# Patient Record
Sex: FEMALE | Race: WHITE | NOT HISPANIC OR LATINO
[De-identification: names, ages, dates, MRNs, and addresses within clinical notes are randomized per-mention and may not be internally consistent; named-entity substitution may affect disease eponyms.]

---

## 2018-01-31 ENCOUNTER — RECORDS - HEALTHEAST (OUTPATIENT)
Dept: ADMINISTRATIVE | Facility: OTHER | Age: 58
End: 2018-01-31

## 2018-02-08 ENCOUNTER — RECORDS - HEALTHEAST (OUTPATIENT)
Dept: ADMINISTRATIVE | Facility: OTHER | Age: 58
End: 2018-02-08

## 2018-02-14 ENCOUNTER — RECORDS - HEALTHEAST (OUTPATIENT)
Dept: ADMINISTRATIVE | Facility: OTHER | Age: 58
End: 2018-02-14

## 2018-02-27 ENCOUNTER — RECORDS - HEALTHEAST (OUTPATIENT)
Dept: ADMINISTRATIVE | Facility: OTHER | Age: 58
End: 2018-02-27

## 2018-03-05 ENCOUNTER — RECORDS - HEALTHEAST (OUTPATIENT)
Dept: ADMINISTRATIVE | Facility: OTHER | Age: 58
End: 2018-03-05

## 2018-03-21 ENCOUNTER — RECORDS - HEALTHEAST (OUTPATIENT)
Dept: ADMINISTRATIVE | Facility: OTHER | Age: 58
End: 2018-03-21

## 2018-06-20 ENCOUNTER — RECORDS - HEALTHEAST (OUTPATIENT)
Dept: ADMINISTRATIVE | Facility: OTHER | Age: 58
End: 2018-06-20

## 2018-07-25 ENCOUNTER — RECORDS - HEALTHEAST (OUTPATIENT)
Dept: ADMINISTRATIVE | Facility: OTHER | Age: 58
End: 2018-07-25

## 2018-08-15 ENCOUNTER — RECORDS - HEALTHEAST (OUTPATIENT)
Dept: ADMINISTRATIVE | Facility: OTHER | Age: 58
End: 2018-08-15

## 2018-09-05 ENCOUNTER — RECORDS - HEALTHEAST (OUTPATIENT)
Dept: ADMINISTRATIVE | Facility: OTHER | Age: 58
End: 2018-09-05

## 2018-09-26 ENCOUNTER — RECORDS - HEALTHEAST (OUTPATIENT)
Dept: ADMINISTRATIVE | Facility: OTHER | Age: 58
End: 2018-09-26

## 2018-10-10 ENCOUNTER — RECORDS - HEALTHEAST (OUTPATIENT)
Dept: ADMINISTRATIVE | Facility: OTHER | Age: 58
End: 2018-10-10

## 2018-11-26 ENCOUNTER — COMMUNICATION - HEALTHEAST (OUTPATIENT)
Dept: NEUROSURGERY | Facility: CLINIC | Age: 58
End: 2018-11-26

## 2018-11-26 ENCOUNTER — RECORDS - HEALTHEAST (OUTPATIENT)
Dept: ADMINISTRATIVE | Facility: OTHER | Age: 58
End: 2018-11-26

## 2018-11-26 ENCOUNTER — COMMUNICATION - HEALTHEAST (OUTPATIENT)
Dept: NEUROLOGY | Facility: CLINIC | Age: 58
End: 2018-11-26

## 2018-11-26 DIAGNOSIS — D49.6 BRAIN TUMOR (H): ICD-10-CM

## 2018-11-27 ENCOUNTER — AMBULATORY - HEALTHEAST (OUTPATIENT)
Dept: NEUROSURGERY | Facility: CLINIC | Age: 58
End: 2018-11-27

## 2018-11-27 ENCOUNTER — RECORDS - HEALTHEAST (OUTPATIENT)
Dept: ADMINISTRATIVE | Facility: OTHER | Age: 58
End: 2018-11-27

## 2018-11-27 ENCOUNTER — COMMUNICATION - HEALTHEAST (OUTPATIENT)
Dept: NEUROSURGERY | Facility: CLINIC | Age: 58
End: 2018-11-27

## 2018-11-27 DIAGNOSIS — Z01.818 PRE-OP EVALUATION: ICD-10-CM

## 2018-11-27 DIAGNOSIS — D49.6 BRAIN TUMOR (H): ICD-10-CM

## 2018-11-28 ENCOUNTER — OFFICE VISIT - HEALTHEAST (OUTPATIENT)
Dept: NEUROSURGERY | Facility: CLINIC | Age: 58
End: 2018-11-28

## 2018-11-28 ENCOUNTER — AMBULATORY - HEALTHEAST (OUTPATIENT)
Dept: LAB | Facility: CLINIC | Age: 58
End: 2018-11-28

## 2018-11-28 DIAGNOSIS — Z01.818 PRE-OP EVALUATION: ICD-10-CM

## 2018-11-28 DIAGNOSIS — D49.6 BRAIN TUMOR (H): ICD-10-CM

## 2018-11-28 LAB
ANION GAP SERPL CALCULATED.3IONS-SCNC: 11 MMOL/L (ref 5–18)
APTT PPP: 26 SECONDS (ref 24–37)
BASOPHILS # BLD AUTO: 0 THOU/UL (ref 0–0.2)
BASOPHILS NFR BLD AUTO: 0 % (ref 0–2)
BUN SERPL-MCNC: 24 MG/DL (ref 8–22)
CALCIUM SERPL-MCNC: 10 MG/DL (ref 8.5–10.5)
CHLORIDE BLD-SCNC: 102 MMOL/L (ref 98–107)
CLOSURE TME COLL+EPINEP BLD: 69 SEC (ref 1–180)
CO2 SERPL-SCNC: 24 MMOL/L (ref 22–31)
CREAT SERPL-MCNC: 0.83 MG/DL (ref 0.6–1.1)
EOSINOPHIL # BLD AUTO: 0 THOU/UL (ref 0–0.4)
EOSINOPHIL NFR BLD AUTO: 0 % (ref 0–6)
ERYTHROCYTE [DISTWIDTH] IN BLOOD BY AUTOMATED COUNT: 12.9 % (ref 11–14.5)
GFR SERPL CREATININE-BSD FRML MDRD: >60 ML/MIN/1.73M2
GLUCOSE BLD-MCNC: 205 MG/DL (ref 70–125)
HCT VFR BLD AUTO: 35.9 % (ref 35–47)
HGB BLD-MCNC: 12.4 G/DL (ref 12–16)
INR PPP: 1.08 (ref 0.9–1.1)
LYMPHOCYTES # BLD AUTO: 1.1 THOU/UL (ref 0.8–4.4)
LYMPHOCYTES NFR BLD AUTO: 10 % (ref 20–40)
MCH RBC QN AUTO: 31.4 PG (ref 27–34)
MCHC RBC AUTO-ENTMCNC: 34.5 G/DL (ref 32–36)
MCV RBC AUTO: 91 FL (ref 80–100)
MONOCYTES # BLD AUTO: 0.4 THOU/UL (ref 0–0.9)
MONOCYTES NFR BLD AUTO: 4 % (ref 2–10)
NEUTROPHILS # BLD AUTO: 9.9 THOU/UL (ref 2–7.7)
NEUTROPHILS NFR BLD AUTO: 87 % (ref 50–70)
PLATELET # BLD AUTO: 328 THOU/UL (ref 140–440)
PMV BLD AUTO: 9.6 FL (ref 8.5–12.5)
POTASSIUM BLD-SCNC: 3.9 MMOL/L (ref 3.5–5)
RBC # BLD AUTO: 3.95 MILL/UL (ref 3.8–5.4)
SODIUM SERPL-SCNC: 137 MMOL/L (ref 136–145)
WBC: 11.6 THOU/UL (ref 4–11)

## 2018-11-29 ENCOUNTER — OFFICE VISIT - HEALTHEAST (OUTPATIENT)
Dept: PALLIATIVE CARE | Facility: CLINIC | Age: 58
End: 2018-11-29

## 2018-11-29 DIAGNOSIS — Z51.5 ENCOUNTER FOR PALLIATIVE CARE: ICD-10-CM

## 2018-11-29 DIAGNOSIS — C54.1 ENDOMETRIAL CANCER (H): ICD-10-CM

## 2018-11-29 ASSESSMENT — MIFFLIN-ST. JEOR: SCORE: 1268.18

## 2018-11-30 ENCOUNTER — COMMUNICATION - HEALTHEAST (OUTPATIENT)
Dept: ONCOLOGY | Facility: HOSPITAL | Age: 58
End: 2018-11-30

## 2018-12-03 ENCOUNTER — SURGERY - HEALTHEAST (OUTPATIENT)
Dept: SURGERY | Facility: CLINIC | Age: 58
End: 2018-12-03

## 2018-12-03 ENCOUNTER — RECORDS - HEALTHEAST (OUTPATIENT)
Dept: ADMINISTRATIVE | Facility: OTHER | Age: 58
End: 2018-12-03

## 2018-12-03 ENCOUNTER — HOSPITAL ENCOUNTER (OUTPATIENT)
Dept: MRI IMAGING | Facility: CLINIC | Age: 58
Discharge: HOME | End: 2018-12-03
Attending: SURGERY | Admitting: SURGERY

## 2018-12-03 ENCOUNTER — ANESTHESIA - HEALTHEAST (OUTPATIENT)
Dept: SURGERY | Facility: CLINIC | Age: 58
End: 2018-12-03

## 2018-12-03 DIAGNOSIS — D49.6 BRAIN TUMOR (H): ICD-10-CM

## 2018-12-03 DIAGNOSIS — Z01.818 PRE-OP EVALUATION: ICD-10-CM

## 2018-12-03 ASSESSMENT — MIFFLIN-ST. JEOR: SCORE: 1254.57

## 2018-12-04 ENCOUNTER — RECORDS - HEALTHEAST (OUTPATIENT)
Dept: ADMINISTRATIVE | Facility: OTHER | Age: 58
End: 2018-12-04

## 2018-12-04 ASSESSMENT — MIFFLIN-ST. JEOR: SCORE: 1278.5

## 2018-12-05 ASSESSMENT — MIFFLIN-ST. JEOR: SCORE: 1278.5

## 2018-12-06 ASSESSMENT — MIFFLIN-ST. JEOR: SCORE: 1308.89

## 2018-12-07 ASSESSMENT — MIFFLIN-ST. JEOR: SCORE: 1312.97

## 2018-12-08 ASSESSMENT — MIFFLIN-ST. JEOR: SCORE: 1284.39

## 2018-12-09 ENCOUNTER — COMMUNICATION - HEALTHEAST (OUTPATIENT)
Dept: NEUROLOGY | Facility: CLINIC | Age: 58
End: 2018-12-09

## 2018-12-09 ASSESSMENT — MIFFLIN-ST. JEOR: SCORE: 1265.34

## 2018-12-11 ENCOUNTER — COMMUNICATION - HEALTHEAST (OUTPATIENT)
Dept: NEUROSURGERY | Facility: CLINIC | Age: 58
End: 2018-12-11

## 2018-12-11 DIAGNOSIS — C79.31 METASTATIC ADENOCARCINOMA TO BRAIN (H): ICD-10-CM

## 2018-12-12 ENCOUNTER — AMBULATORY - HEALTHEAST (OUTPATIENT)
Dept: NEUROSURGERY | Facility: CLINIC | Age: 58
End: 2018-12-12

## 2018-12-12 ENCOUNTER — COMMUNICATION - HEALTHEAST (OUTPATIENT)
Dept: NEUROSURGERY | Facility: CLINIC | Age: 58
End: 2018-12-12

## 2018-12-12 DIAGNOSIS — Z51.89 VISIT FOR WOUND CHECK: ICD-10-CM

## 2018-12-12 DIAGNOSIS — D49.6 BRAIN TUMOR (H): ICD-10-CM

## 2018-12-13 ENCOUNTER — OFFICE VISIT - HEALTHEAST (OUTPATIENT)
Dept: OCCUPATIONAL THERAPY | Facility: REHABILITATION | Age: 58
End: 2018-12-13

## 2018-12-13 ENCOUNTER — RECORDS - HEALTHEAST (OUTPATIENT)
Dept: ADMINISTRATIVE | Facility: OTHER | Age: 58
End: 2018-12-13

## 2018-12-13 DIAGNOSIS — R41.89 COGNITIVE CHANGES: ICD-10-CM

## 2018-12-13 DIAGNOSIS — Z78.9 DECREASED ACTIVITIES OF DAILY LIVING (ADL): ICD-10-CM

## 2018-12-18 ENCOUNTER — OFFICE VISIT - HEALTHEAST (OUTPATIENT)
Dept: ONCOLOGY | Facility: HOSPITAL | Age: 58
End: 2018-12-18

## 2018-12-18 DIAGNOSIS — C79.31 BRAIN METASTASES: ICD-10-CM

## 2018-12-18 DIAGNOSIS — C54.1 ENDOMETRIAL CANCER (H): ICD-10-CM

## 2018-12-24 ENCOUNTER — HOSPITAL ENCOUNTER (OUTPATIENT)
Dept: MRI IMAGING | Facility: CLINIC | Age: 58
Discharge: HOME | End: 2018-12-24
Attending: SURGERY

## 2018-12-24 ENCOUNTER — AMBULATORY - HEALTHEAST (OUTPATIENT)
Dept: NEUROSURGERY | Facility: CLINIC | Age: 58
End: 2018-12-24

## 2018-12-24 ENCOUNTER — OFFICE VISIT - HEALTHEAST (OUTPATIENT)
Dept: SPEECH THERAPY | Facility: REHABILITATION | Age: 58
End: 2018-12-24

## 2018-12-24 DIAGNOSIS — C79.31 METASTATIC ADENOCARCINOMA TO BRAIN (H): ICD-10-CM

## 2018-12-24 DIAGNOSIS — R41.841 COGNITIVE COMMUNICATION DEFICIT: ICD-10-CM

## 2018-12-24 DIAGNOSIS — R47.01 APHASIA: ICD-10-CM

## 2018-12-24 DIAGNOSIS — Z48.02: ICD-10-CM

## 2018-12-24 DIAGNOSIS — Z48.02 ENCOUNTER FOR REMOVAL OF SUTURES: ICD-10-CM

## 2018-12-28 ENCOUNTER — AMBULATORY - HEALTHEAST (OUTPATIENT)
Dept: ONCOLOGY | Facility: HOSPITAL | Age: 58
End: 2018-12-28

## 2018-12-28 DIAGNOSIS — C79.31 BRAIN METASTASIS: ICD-10-CM

## 2018-12-28 DIAGNOSIS — C54.1 ENDOMETRIAL CANCER (H): ICD-10-CM

## 2018-12-29 ENCOUNTER — HOME CARE/HOSPICE - HEALTHEAST (OUTPATIENT)
Dept: HOSPICE | Facility: HOSPICE | Age: 58
End: 2018-12-29

## 2018-12-30 ENCOUNTER — HOME CARE/HOSPICE - HEALTHEAST (OUTPATIENT)
Dept: HOSPICE | Facility: HOSPICE | Age: 58
End: 2018-12-30

## 2018-12-31 ENCOUNTER — COMMUNICATION - HEALTHEAST (OUTPATIENT)
Dept: SPEECH THERAPY | Facility: REHABILITATION | Age: 58
End: 2018-12-31

## 2019-01-02 ENCOUNTER — OFFICE VISIT - HEALTHEAST (OUTPATIENT)
Dept: RADIATION ONCOLOGY | Facility: HOSPITAL | Age: 59
End: 2019-01-02

## 2019-01-02 ENCOUNTER — AMBULATORY - HEALTHEAST (OUTPATIENT)
Dept: RADIATION ONCOLOGY | Facility: HOSPITAL | Age: 59
End: 2019-01-02

## 2019-01-02 ENCOUNTER — COMMUNICATION - HEALTHEAST (OUTPATIENT)
Dept: NEUROSURGERY | Facility: CLINIC | Age: 59
End: 2019-01-02

## 2019-01-02 DIAGNOSIS — C54.1 ENDOMETRIAL CANCER (H): ICD-10-CM

## 2019-01-02 DIAGNOSIS — G89.18 POSTOPERATIVE PAIN: ICD-10-CM

## 2019-01-02 DIAGNOSIS — C79.31 BRAIN METASTASES: ICD-10-CM

## 2019-01-04 ENCOUNTER — HOME CARE/HOSPICE - HEALTHEAST (OUTPATIENT)
Dept: HOSPICE | Facility: HOSPICE | Age: 59
End: 2019-01-04

## 2019-01-07 ENCOUNTER — COMMUNICATION - HEALTHEAST (OUTPATIENT)
Dept: RADIATION ONCOLOGY | Facility: HOSPITAL | Age: 59
End: 2019-01-07

## 2019-01-07 ENCOUNTER — COMMUNICATION - HEALTHEAST (OUTPATIENT)
Dept: NEUROSURGERY | Facility: CLINIC | Age: 59
End: 2019-01-07

## 2019-01-14 ENCOUNTER — OFFICE VISIT - HEALTHEAST (OUTPATIENT)
Dept: RADIATION ONCOLOGY | Facility: CLINIC | Age: 59
End: 2019-01-14

## 2019-01-14 DIAGNOSIS — F41.9 ANXIETY: ICD-10-CM

## 2019-01-14 DIAGNOSIS — C79.31 BRAIN METASTASES: ICD-10-CM

## 2019-01-14 DIAGNOSIS — D49.6 BRAIN TUMOR (H): ICD-10-CM

## 2019-01-14 RX ORDER — IBUPROFEN 200 MG
600 TABLET ORAL EVERY 6 HOURS PRN
Status: SHIPPED | COMMUNITY
Start: 2019-01-14

## 2019-01-15 ENCOUNTER — OFFICE VISIT - HEALTHEAST (OUTPATIENT)
Dept: RADIATION ONCOLOGY | Facility: HOSPITAL | Age: 59
End: 2019-01-15

## 2019-01-15 DIAGNOSIS — Z51.5 ENCOUNTER FOR PALLIATIVE CARE: ICD-10-CM

## 2019-01-15 DIAGNOSIS — C54.1 ENDOMETRIAL CANCER (H): ICD-10-CM

## 2019-01-15 DIAGNOSIS — G89.3 CANCER RELATED PAIN: ICD-10-CM

## 2019-01-16 ENCOUNTER — HOME CARE/HOSPICE - HEALTHEAST (OUTPATIENT)
Dept: HOSPICE | Facility: HOSPICE | Age: 59
End: 2019-01-16

## 2019-01-17 ENCOUNTER — OFFICE VISIT - HEALTHEAST (OUTPATIENT)
Dept: ONCOLOGY | Facility: HOSPITAL | Age: 59
End: 2019-01-17

## 2019-01-17 DIAGNOSIS — C54.1 ENDOMETRIAL CANCER (H): ICD-10-CM

## 2019-01-17 DIAGNOSIS — F43.25 ADJUSTMENT REACTION WITH MIXED DISTURBANCE OF EMOTIONS AND CONDUCT: ICD-10-CM

## 2019-01-17 DIAGNOSIS — C79.31 BRAIN METASTASIS: ICD-10-CM

## 2019-01-18 ENCOUNTER — OFFICE VISIT - HEALTHEAST (OUTPATIENT)
Dept: NEUROSURGERY | Facility: CLINIC | Age: 59
End: 2019-01-18

## 2019-01-18 DIAGNOSIS — D49.6 BRAIN TUMOR (H): ICD-10-CM

## 2019-01-18 ASSESSMENT — MIFFLIN-ST. JEOR: SCORE: 1267.61

## 2019-02-07 ENCOUNTER — HOME CARE/HOSPICE - HEALTHEAST (OUTPATIENT)
Dept: HOSPICE | Facility: HOSPICE | Age: 59
End: 2019-02-07

## 2019-02-10 ENCOUNTER — HOME CARE/HOSPICE - HEALTHEAST (OUTPATIENT)
Dept: HOSPICE | Facility: HOSPICE | Age: 59
End: 2019-02-10

## 2019-02-11 ENCOUNTER — HOME CARE/HOSPICE - HEALTHEAST (OUTPATIENT)
Dept: HOSPICE | Facility: HOSPICE | Age: 59
End: 2019-02-11

## 2019-02-11 ENCOUNTER — AMBULATORY - HEALTHEAST (OUTPATIENT)
Dept: OTHER | Facility: CLINIC | Age: 59
End: 2019-02-11

## 2019-02-11 RX ORDER — PHENOL 1.4 %
10 AEROSOL, SPRAY (ML) MUCOUS MEMBRANE AT BEDTIME
Status: SHIPPED | COMMUNITY
Start: 2019-02-11

## 2019-02-12 ENCOUNTER — HOME CARE/HOSPICE - HEALTHEAST (OUTPATIENT)
Dept: HOSPICE | Facility: HOSPICE | Age: 59
End: 2019-02-12

## 2019-02-12 ENCOUNTER — AMBULATORY - HEALTHEAST (OUTPATIENT)
Dept: HOSPICE | Facility: HOSPICE | Age: 59
End: 2019-02-12

## 2019-02-13 ENCOUNTER — HOME CARE/HOSPICE - HEALTHEAST (OUTPATIENT)
Dept: HOSPICE | Facility: HOSPICE | Age: 59
End: 2019-02-13

## 2019-02-14 ENCOUNTER — HOME CARE/HOSPICE - HEALTHEAST (OUTPATIENT)
Dept: HOSPICE | Facility: HOSPICE | Age: 59
End: 2019-02-14

## 2019-02-14 RX ORDER — ATROPINE SULFATE 10 MG/ML
2 SOLUTION/ DROPS OPHTHALMIC EVERY 6 HOURS PRN
Status: SHIPPED | COMMUNITY
Start: 2019-02-14

## 2019-02-14 RX ORDER — HALOPERIDOL 2 MG/ML
0.5-2 SOLUTION ORAL EVERY 4 HOURS PRN
Status: SHIPPED | COMMUNITY
Start: 2019-02-14

## 2019-02-16 ENCOUNTER — HOME CARE/HOSPICE - HEALTHEAST (OUTPATIENT)
Dept: HOSPICE | Facility: HOSPICE | Age: 59
End: 2019-02-16

## 2019-02-16 ENCOUNTER — AMBULATORY - HEALTHEAST (OUTPATIENT)
Dept: HOSPICE | Facility: HOSPICE | Age: 59
End: 2019-02-16

## 2019-02-16 RX ORDER — OXYCODONE HYDROCHLORIDE 5 MG/1
10 TABLET ORAL
Status: SHIPPED | COMMUNITY
Start: 2019-02-16

## 2019-02-16 RX ORDER — OXYCODONE HYDROCHLORIDE 5 MG/1
10 TABLET ORAL EVERY 4 HOURS
Status: SHIPPED | COMMUNITY
Start: 2019-02-16

## 2019-02-16 RX ORDER — POLYETHYLENE GLYCOL 3350 17 G/17G
17 POWDER, FOR SOLUTION ORAL DAILY
Status: SHIPPED | COMMUNITY
Start: 2019-02-16

## 2019-02-16 RX ORDER — DEXAMETHASONE 4 MG/1
4 TABLET ORAL
Status: SHIPPED | COMMUNITY
Start: 2019-02-16

## 2019-02-16 RX ORDER — AMOXICILLIN 250 MG
2 CAPSULE ORAL 2 TIMES DAILY
Status: SHIPPED | COMMUNITY
Start: 2019-02-16

## 2019-02-17 ENCOUNTER — HOME CARE/HOSPICE - HEALTHEAST (OUTPATIENT)
Dept: HOSPICE | Facility: HOSPICE | Age: 59
End: 2019-02-17

## 2019-02-17 ENCOUNTER — RECORDS - HEALTHEAST (OUTPATIENT)
Dept: ADMINISTRATIVE | Facility: OTHER | Age: 59
End: 2019-02-17

## 2019-02-17 RX ORDER — OXYCODONE HYDROCHLORIDE 5 MG/1
15 TABLET ORAL AT BEDTIME
Status: SHIPPED | COMMUNITY
Start: 2019-02-16

## 2019-02-18 ENCOUNTER — HOME CARE/HOSPICE - HEALTHEAST (OUTPATIENT)
Dept: HOSPICE | Facility: HOSPICE | Age: 59
End: 2019-02-18

## 2019-02-18 RX ORDER — LORAZEPAM 2 MG/ML
1 CONCENTRATE ORAL EVERY 4 HOURS
Status: SHIPPED | COMMUNITY
Start: 2019-02-18

## 2019-02-18 RX ORDER — LORAZEPAM 2 MG/ML
2 CONCENTRATE ORAL SEE ADMIN INSTRUCTIONS
Status: SHIPPED | COMMUNITY
Start: 2019-02-18

## 2019-02-19 ENCOUNTER — HOME CARE/HOSPICE - HEALTHEAST (OUTPATIENT)
Dept: HOSPICE | Facility: HOSPICE | Age: 59
End: 2019-02-19

## 2019-02-19 ENCOUNTER — AMBULATORY - HEALTHEAST (OUTPATIENT)
Dept: HOSPICE | Facility: HOSPICE | Age: 59
End: 2019-02-19

## 2019-02-20 ENCOUNTER — HOME CARE/HOSPICE - HEALTHEAST (OUTPATIENT)
Dept: HOSPICE | Facility: HOSPICE | Age: 59
End: 2019-02-20

## 2019-02-21 ENCOUNTER — OFFICE VISIT - HEALTHEAST (OUTPATIENT)
Dept: ONCOLOGY | Facility: HOSPITAL | Age: 59
End: 2019-02-21

## 2019-02-21 DIAGNOSIS — F43.25 ADJUSTMENT REACTION WITH MIXED DISTURBANCE OF EMOTIONS AND CONDUCT: ICD-10-CM

## 2019-02-22 ENCOUNTER — COMMUNICATION - HEALTHEAST (OUTPATIENT)
Dept: NEUROSURGERY | Facility: CLINIC | Age: 59
End: 2019-02-22

## 2019-02-25 ENCOUNTER — COMMUNICATION - HEALTHEAST (OUTPATIENT)
Dept: RADIATION ONCOLOGY | Facility: CLINIC | Age: 59
End: 2019-02-25

## 2019-05-30 ENCOUNTER — OFFICE VISIT - HEALTHEAST (OUTPATIENT)
Dept: ONCOLOGY | Facility: HOSPITAL | Age: 59
End: 2019-05-30

## 2019-05-30 DIAGNOSIS — F43.21 GRIEF REACTION: ICD-10-CM

## 2021-05-29 NOTE — PROGRESS NOTES
I met with Sheila's , Bear for an emotional support visit. Sheila  on 2019.  Bear processed his grief and loss related to her death.  We also discussed other stress issues that he is having related to needing to move to a new home in a new community as he will be starting a new position with his company about 4 hours away from his current home in [place].    We discussed ongoing strategies to help Bear cope manage his grief.  I also suggested that he may benefit from an antidepressant or an antianxiety medication.  He plans to contact a physician to further explore this option.    Plan: I will continue to be available to build for ongoing grief support and assistance as needed.    Carmen Vasquez MA, LP, LICSW

## 2021-06-02 VITALS — BODY MASS INDEX: 23.73 KG/M2 | WEIGHT: 147 LBS

## 2021-06-02 VITALS — BODY MASS INDEX: 23.95 KG/M2 | HEIGHT: 66 IN | WEIGHT: 149 LBS

## 2021-06-02 VITALS — HEIGHT: 66 IN | BODY MASS INDEX: 23.87 KG/M2 | WEIGHT: 148.5 LBS

## 2021-06-02 VITALS — BODY MASS INDEX: 24.16 KG/M2 | WEIGHT: 149.7 LBS

## 2021-06-02 VITALS — BODY MASS INDEX: 23.97 KG/M2 | HEIGHT: 66 IN | WEIGHT: 149.13 LBS

## 2021-06-02 VITALS — BODY MASS INDEX: 23.69 KG/M2 | WEIGHT: 146.8 LBS

## 2021-06-16 PROBLEM — F32.A DEPRESSION: Status: ACTIVE | Noted: 2018-11-23

## 2021-06-16 PROBLEM — I10 HTN (HYPERTENSION): Status: ACTIVE | Noted: 2018-11-06

## 2021-06-16 PROBLEM — D49.6 BRAIN TUMOR (H): Status: ACTIVE | Noted: 2018-12-03

## 2021-06-16 PROBLEM — G93.6 VASOGENIC EDEMA (H): Status: ACTIVE | Noted: 2018-11-23

## 2021-06-16 PROBLEM — F41.9 ANXIETY: Status: ACTIVE | Noted: 2018-11-23

## 2021-06-16 PROBLEM — C54.1 ENDOMETRIAL CANCER (H): Status: ACTIVE | Noted: 2018-11-06

## 2021-06-16 PROBLEM — E03.9 HYPOTHYROIDISM: Status: ACTIVE | Noted: 2018-11-06

## 2021-06-16 PROBLEM — H60.8X3 CHRONIC ECZEMATOUS OTITIS EXTERNA OF BOTH EARS: Status: ACTIVE | Noted: 2018-11-13

## 2021-06-16 PROBLEM — R90.89 MIDLINE SHIFT OF BRAIN: Status: ACTIVE | Noted: 2018-11-23

## 2021-06-17 NOTE — PATIENT INSTRUCTIONS - HE
Patient Instructions by Dominique Badillo RN at 1/2/2019  8:30 AM     Author: Dominique Badillo RN Service: -- Author Type: Registered Nurse    Filed: 1/2/2019  9:02 AM Encounter Date: 1/2/2019 Status: Signed    : Dominique Badillo RN (Registered Nurse)       Patient Education     Radiation Therapy Treatment  Radiation therapy can help you in your fight against cancer. It begins with a session to discuss treatment with your doctor. If you and your doctor decide on radiation, you will return for a simulation. The simulation is a planning session that helps the doctor target your cancer. He or she will design a radiation plan to protect normal tissues. When the simulation and plan are completed, you will begin your daily treatments. Treatment is usually once daily Monday to Friday. It takes less than a half an hour. Sometimes you may need radiation twice a day, with usually 6 hours between treatments. After the course of radiation is complete, you will be scheduled for follow-up appointments. This is to make sure the cancer is under control. The follow-ups will also make sure that any side effects from the treatment are taken care of.  Radiation therapy uses high-energy X-rays to kill cancer cells.   Your treatment planning visit: The simulation  Your radiation therapy team uses a special machine called a simulator to map out your treatment. The simulator is usually an X-ray machine (fluoroscopy), CT scanner, MRI scanner, or PET-CT scanner machine. Laser lights act as guides to help position your body accurately. During this visit:    The team figures out the best position for your body. They make notes in your chart so youll be placed the same way each time.    You may use special devices to keep your body correctly positioned and still during treatment. These may include molds, masks, rests, and blocks.    The team makes ink marks on your skin. These will help you get in the same position for each treatment. Tiny  permanent tattoos may also be used.     Markers such as metal balls or wires may be put on or in your body. Sometime these are taped to the skin to help with the imaging process. These work with the X-rays to position your body. The markers are removed when the visit is over.  After the team has the imaging and data, the information is sent into the computer planning system. Your doctor and the team of physicists and dosimetrists design a treatment field. The field will best target your cancer and how it might spread. It will also help limit radiation to nearby normal tissues.  Your treatments  Each treatment usually takes 10 to 30 minutes. You may need to change into a hospital gown. The radiation therapist puts you in the correct position on the treatment table, then leaves the room. Sometimes you may need more imaging before each treatment. The machine may take digital X-rays or a CT scan to help make sure you are lined up correctly. During treatment, lie as still as you can and breathe normally. You will hear noises coming from the machine. You can talk to the radiation therapist, who watches you from the control room on a TV monitor. After treatment, the therapist will help you off the table. You can then get dressed and go back to your normal activities.  Date Last Reviewed: 1/14/2016 2000-2017 [employer]. [address] [place] [address], [place] [address]. All rights reserved. This information is not intended as a substitute for professional medical care. Always follow your healthcare professional's instructions.

## 2021-06-18 NOTE — LETTER
Letter by Sandra Rodriguez RN at      Author: Sandra Rodriguez RN Service: -- Author Type: --    Filed:  Encounter Date: 2/11/2019 Status: (Other)       [facility]  [address] [place] [address]      Janis SALDANA [facility]   [address] [facility] [address]      Dear Janis,     We have reviewed the Health Care Directive dated 12/3/2018 which you presented for addition to your medical record. Unfortunately, our review indicates the document is not legally valid for the following reason: Two employees signed as witnesses.     In order to create a legal document you will need to have your signature re-witnessed or have it notarized. Only one of your 2 witnesses can be employed by our health care system by state law. In addition, notaries and witnesses cannot be named as your health care agents.     We greatly value the opportunity to assist you in documenting your choices and to honor your wishes. We apologize for any inconvenience. We have several options to assist you in updating your document so it meets legal requirements.       Health Care Directives and Advance Care Planning resources can be viewed and printed for free at our web site:[email or web address].       Free group classes on Advance Care Planning and completing a Health Care Directive are available at multiple locations and times. These classes are led by trained staff who will provide information and guide you through a Health Care Directive. They can also review, notarize and add your Health Care Directive to your medical record. [place] for a class at [email or web address] or by calling [facility] at [phone] or toll free [phone].      COPIES of completed Health Care Directives can be brought or mailed to any of our locations, including the address listed below. You can also email a copy to [email or web address] .       For  additional assistance or questions you can email us at [email or web address] or call [phone]    Sincerely,   [facility]  [address] [address]   Email us: [email or web address] Call us: [phone]  Visit at: [email or web address]

## 2021-06-22 NOTE — TELEPHONE ENCOUNTER
Pt is s/p gross total re-resection of two right hemisphere brain mets by Dr. Christine on 12-3-18.    The dura and bone flap were left out as there was tumor in the bone.  Her  calls today to report increase flap area and swelling in the face and neck and cheeks.  She denies headache, nausea/vomiting.  I recommended appt for wound check but pt is back home in [place] and returning next Monday for appt with .  She has PCP in [place] and I suggested he take her there to have someone assess and he feels more comfortable with that than waiting until next week.    Daisha Marcano RN

## 2021-06-22 NOTE — PROGRESS NOTES
"Outpatient Palliative Care Consult      Janis Yin,  1960, MRN [id number]        PCP: Syed Weiss MD, [phone]   Code status:  DNR/I       Extended Emergency Contact Information  Primary Emergency Contact: Bear Yin  Home Phone: [phone]  Mobile Phone: [phone]  Relation: Spouse  Secondary Emergency Contact: decline, decline  Relation: Declined          Impression and Recommendations:  1. Palliative Care Encounter - Please see discussion below.  Comment:  Has craniectomy scheduled for 12/3/18 for removal of tumor; DNR/I confirmed today; would want \"natural death\" if heart stops; would not want to be maintained on ventilator or feeding tube if terminal; open to hospice after surgery and radiation treatment; understands that interventions will just \"buy her time\"; most concerned about grand children and how to talk with them; tearful when talking about goals of care and Advance Directive  Plan:  Referred to Carmen Vasquez, psychotherapist with oncology for family counseling  Gave resources on activities to do with grand children and  planning guide         Chief Complaint Goals of care discussion       HPI    Janis Yin is a 58 y.o. year old female presents to the Outpatient Palliative Care Clinic today for ongoing symptom management and goals of care discussion.   HECTOR Grayson was present as part of the interdisciplinary team.  Patient is accompanied today by her daughter Astrid. Her  Bear was on speaker phone and was able to participate in discussion and decisions.    Referred by Dr. De Los Santos, inpatient Palliative Care.    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  She was treated with chemotherapy and radiation but had more progression of disease.  Craniotomy was done in 2018 with gamma knife.  MRI  showed 5 new brain metastases, started whole brain radiation.  Last had whole brain radiation " "10/10/18, and has since moved to the [facility] from [place] to be closer to her children, grandchildren and extended family.  Her  remains in [facility] where he is working.  Janis had recent hospitalization because of falls and mental status changes.  This has improved with steroids and she is planning to have re do craniectomy on 12/3/18.  She is hoping to gain more time.  She will also meet with radiation oncology in December.  She will be ready to enroll in hospice once no further treatment or radiation.  Patient taking Keppra 1000 mg twice daily for seizure prevention as well as Namenda 10 mg twice daily for dementia.  Goals of care discussed today.  It was a difficult conversation but patient was able to say that she would want \"a natural death\" if her heart stopped and she would want discontinuation of any heroic measures if she could not survive and was terminal.   Bear and daughter Astrid participated in this conversation.     EXAM: MR BRAIN W WO CONTRAST  LOCATION: [facility]  DATE/TIME: 11/23/2018 1:33 AM     FINDINGS:  INTRACRANIAL CONTENTS: Postop changes related to a high right parietal derek hole. Multifocal lobulated enhancing material is noted deep to this craniotomy extending from the dural surface and invading the underlying brain parenchyma. The medial component   measures up to 39 x 35 x 21 mm (TR by AP by CC). This is contiguous with enhancing material in the overlying calvarium. There is a 15 mm lobulated focus of abnormal enhancing material overlying the right middle frontal gyrus. There is smooth dural   enhancement overlying the right frontal lobe. There is extensive edema in the underlying right frontal lobe. No acute hemorrhage. There is a 7 mm right-to-left midline shift. Scattered nonspecific T2/FLAIR hyperintensities within the cerebral white   matter most consistent with mild chronic microvascular ischemic change. Ventricles and sulci are normal for age. " Normal position of the cerebellar tonsils.      SELLA: No significant abnormality accounting for technique.     OSSEOUS STRUCTURES/SOFT TISSUES: Right parietal craniotomy. The major intracranial vascular flow voids are maintained.      ORBITS: No significant abnormality accounting for technique.      SINUSES/MASTOIDS: No significant paranasal sinus mucosal disease. No significant middle ear or mastoid effusion.      CONCLUSION:  1.  Deep to a previous craniotomy of the right parietal bone are multifocal lobulated enhancing lesions extending into the brain parenchyma from the overlying dura worrisome for some residual or recurrent neoplasm. There is considerable vasogenic edema   in the underlying right frontal lobe.  2.  7 mm right-to-left midline shift.    Previous hospitalizations:  11/22/18-11/25/18 for decreased mentation and falls    Palliative Care Assessment:  Living situation:  Currently lives with daughter and her family locally;  resides in [place] so he can continue to work and so she can be close to her children  Baseline functional status/Current Palliative Performance Score:  (0=death; 100=fully functional):    60%- 1. Reduced; 2. Unable to do hobby/housework, significant disease; 3. Occasional assistance necessary; 4. Normal or reduced; 5. Full or confusion   PPS prior to decline/recent changes in condition:  Marital status/children: ; 2 children; 6 grandchildren  Support systems:  Strong friend system in [place]; family in [facility]; prayer  Financial concerns:   staying in [place] to continue to work  Employment:  Not employed    Serious Illness Conversation:  Patient's current understanding of illness:  Understands she has terminal illness that can not be cured; surgery and radiation are to buy her time  Patient's/family's hopes/wishes/goals:  Grandchildren are the most important thing to her; struggling to talk to them about her illness  Patient's/family's fears/worries  "about future with your health:  Care needs; struggling to say good bye to family;  Deciding whether she should be here or in [place]  What gives you strength/how do you cope:  Family, cirilo in God/prayer/Jain  How much does your family know about your wishes:  Discussed today with spouse Bear and daughter Astrid; spouse Bear states \"I know now\"    Advance Care Planning:  Code Status: DNR/I  Health Care Directive:  Working on one at home; confirms DNR/I today and wishes no heroics such as mechanical ventilator or feeding tube if terminal; \"i would want them to turn it off\"  Documented Healthcare Agent or surrogate:  Bear and sister Roxann  Decision making capacity:  Patient demonstrates decision-making capacity; s/he does demonstrate understanding of relevant information about condition, the available test and treatment options, use reason to make a decision about these, and communicate choice about these. (NELLA 306, 4, p. 420-4).    Patient does meet criteria for Hospice Medicare benefit. Diagnosis metastatic endometrial cancer with brain metastasis.  Would be ready for hospice once surgery/radiation completed.        Medical History  Active Ambulatory (Non-Hospital) Problems    Diagnosis     Brain metastases (H)     Anxiety     Depression     Vasogenic edema (H)     Midline shift of brain     Chronic eczematous otitis externa of both ears     Endometrial cancer (H)     HTN (hypertension)     Hypothyroidism     Malignant neoplasm metastatic to brain (H)     Past Medical History:   Diagnosis Date     Endometrial cancer (H)      Hypertension      Hypothyroid     Surgical History  She  has a past surgical history that includes Craniotomy (06/2018); Hysterectomy (03/2018); and Shoulder surgery.   Social History  Reviewed, and she  reports that she quit smoking about 2 months ago. she has never used smokeless tobacco. She reports that she does not drink alcohol or use drugs.   Allergies  Allergies   Allergen " Reactions     Lisinopril Headache     Other reaction(s): Nausea only     Morphine Rash     [place] Itching    Family History  Reviewed, and family history includes Aortic aneurysm in her father; Colon cancer in her maternal grandmother and sister; Coronary artery disease in her paternal grandfather; Diabetes in her paternal grandmother; Hypertension in her maternal grandmother; Hypothyroidism in her mother; Stroke in her paternal grandfather; Valvular heart disease in her mother.   Psychosocial Needs  Social History     Social History Narrative     Not on file     Additional psychosocial needs reviewed per nursing assessment.     Medications & Allergies   Medications:     Current Outpatient Medications:      b complex vitamins tablet, Take 1 tablet by mouth daily., Disp: , Rfl:      citalopram (CELEXA) 20 MG tablet, Take 20 mg by mouth daily., Disp: , Rfl:      dexamethasone (DECADRON) 2 MG tablet, Take 4 mg by mouth 3 (three) times a day for 3 days, THEN 3 mg 3 (three) times a day for 3 days, THEN 2 mg 3 (three) times a day for 3 days, THEN 2 mg 2 (two) times a day., Disp: 101 tablet, Rfl: 0     docusate sodium (COLACE) 100 MG capsule, Take 100 mg by mouth daily as needed., Disp: , Rfl:      famotidine (PEPCID) 20 MG tablet, Take 1 tablet (20 mg total) by mouth 2 (two) times a day., Disp: , Rfl: 0     fluocinolone (SYNALAR) 0.01 % external solution, Apply 5 application topically as needed Apply 5 drops to affected ear twice a day for 1-2 weeks as needed., Disp: , Rfl:      glucosamine sulfate 500 mg Tab, Take 2 capsules by mouth daily., Disp: , Rfl:      hydroCHLOROthiazide (HYDRODIURIL) 25 MG tablet, Take 25 mg by mouth daily., Disp: , Rfl:      levETIRAcetam (KEPPRA) 1000 MG tablet, Take 1,000 mg by mouth 2 (two) times a day., Disp: , Rfl:      levothyroxine (SYNTHROID, LEVOTHROID) 50 MCG tablet, Take 1 tablet by mouth daily., Disp: , Rfl:      memantine (NAMENDA) 10 MG tablet, Take 10 mg by mouth 2 (two)  "times a day., Disp: , Rfl:      multivitamin (ONE A DAY) per tablet, Take 3 tablets by mouth daily., Disp: , Rfl:      ondansetron (ZOFRAN) 8 MG tablet, Take 8 mg by mouth as needed., Disp: , Rfl:      senna (SENOKOT) 8.6 mg tablet, Take 1 tablet by mouth 2 (two) times a day as needed., Disp: , Rfl:      sodium chloride 1 gram tablet, Take 2 tablets (2 g total) by mouth 2 (two) times a day with meals., Disp: 75 tablet, Rfl: 0     triamcinolone (KENALOG) 0.1 % cream, Apply 1 application topically 2 (two) times a day as needed., Disp: , Rfl:     Allergies/intolerances:    Allergies   Allergen Reactions     Lisinopril Headache     Other reaction(s): Nausea only     Morphine Rash     [place] Itching            Review of Systems:  Pain: 0/10  Dyspnea: 0/10  Fatigue: 2/10  Nausea: 0/10  Anorexia: 0/10  Drowsiness: 1/10  Constipation: no  Agitation: 0  Anxiety: 2/10  Depression: 2/10; tearful at times during visit    Dementia: forgetful; daughter states intermittent confusion; cognitive slowness   Delirium: 0  Coma: 0    Physical Exam:  /68 (Patient Site: Left Arm, Patient Position: Sitting, Cuff Size: Adult Regular)   Pulse 66   Ht 5' 6\" (1.676 m)   Wt 149 lb 2 oz (67.6 kg)   SpO2 99%   BMI 24.07 kg/m    General appearance: alert, appears stated age, cooperative, no distress and slightly slowed mentation  Head: Normocephalic, without obvious abnormality, atraumatic; alopecia; wearing scarf over head  Nose: Nares normal. Septum midline. Mucosa normal. No drainage or sinus tenderness.  Lungs: not labored  Extremities: extremities normal, atraumatic, no cyanosis or edema  Skin: Skin color, texture, turgor normal. No rashes or lesions  Neurologic: Mental status: Alert, oriented, thought content appropriate, mild slowing of cognitive processes; took time to respond to questions; daughter helped explain conversations they have been having  Motor:able to move all extremities  Gait: able to stand and ambulate; " appears frail       Pertinent Labs  Lab Results: personally reviewed.   Lab Results   Component Value Date     11/28/2018     11/25/2018     11/25/2018    K 3.9 11/28/2018    K 4.0 11/25/2018    K 4.2 11/24/2018    CO2 24 11/28/2018    CO2 22 11/23/2018    BUN 24 (H) 11/28/2018    BUN 9 11/23/2018    CREATININE 0.83 11/28/2018    CREATININE 0.69 11/23/2018    CALCIUM 10.0 11/28/2018    CALCIUM 9.5 11/23/2018     Lab Results   Component Value Date    WBC 11.6 (H) 11/28/2018    WBC 6.9 11/23/2018    HGB 12.4 11/28/2018    HGB 9.9 (L) 11/24/2018    HGB 10.5 (L) 11/23/2018    HCT 35.9 11/28/2018    HCT 29.8 (L) 11/23/2018    MCV 91 11/28/2018    MCV 90 11/23/2018     11/28/2018     11/25/2018     11/23/2018     AST   Date Value Ref Range Status   11/23/2018 19 0 - 40 U/L Final     ALT   Date Value Ref Range Status   11/23/2018 14 0 - 45 U/L Final     Alkaline Phosphatase   Date Value Ref Range Status   11/23/2018 87 45 - 120 U/L Final     Albumin   Date Value Ref Range Status   11/23/2018 3.7 3.5 - 5.0 g/dL Final      Pertinent Radiology  Radiology Results: Personally reviewed impression/s  EKG Results: not reviewed.    E/M time/ total time: 60 minutes    >50% of time during encounter was spent with family and patient on counseling and/or care coordination as documented above. Y    Advanced care planning 20 minutes discussing goals of care including Advance Directive and resuscitation status.    Sheila Martinez, APRN/CNP  Palliative Care Services  [phone]

## 2021-06-22 NOTE — PROGRESS NOTES
[facility]   Evaluation and Initial Plan of Care    Patient Name: Janis Yin  Date of evaluation: 12/24/2018  Referral Diagnosis: Brain tumor  Referring provider: Luis Christine MD  Visit Diagnosis:     ICD-10-CM    1. Cognitive communication deficit R41.841    2. Aphasia R47.01          Assessment:      Patient presents with mild-moderate cognitive deficits along with mild aphasia.    Long Term Goals  Pt. will improve cognitive, linguistic skills to allow for completion of daily activities and interactions by: : 12 weeks    Short Term Goals  Pt. will generate 15 items: in 3 categories;with minimal cuing;with 90% accuracy;in 12 weeks  Pt. will form sentences to explain, describe with : with minimal cuing;with 90% accuracy;in 12 weeks  Pt will participate in a cognitive assessment to determine therapy needs and D/C needs by 2 weeks.  Pt will improve recall of details of verbal paragraphs through use of memory strategies with 90% accuracy by 12 weeks.  Pt will perform moderately complex deduction reasoning tasks and problem solving tasks with 90% accuracy by 12 weeks.    Patient goal:  To improve memory  Patient's expectations/goals are realistic.    Rehab potential: Good based on prior level of function, evaluation results and motivation and cooperation.     Plan / Patient Instructions:      Plan of Care:  Authorization / Certification Start Date: 12/24/18  Authorization / Certification End Date: 03/24/19  Authorization / Certification Number of Visits: 24  Communication with: Referral Source  Patient Related Instruction: Nature of Condition;Treatment plan and rationale;Basis of treatment;Expected outcome  Times per Week: 2  Number of Weeks: 10  Number of Visits: 20  Discharge Planning: Patient will achieve speech therapy goals prior to discharge from speech therapy.      Plan:   Speech therapy 2 times a week for 10 weeks.  Ongoing  Patient, Family instruction in  evaluation results,  treatment plan/rationale, home program and expected functional outcome.    Education:   Patient, Family participated in education regarding evaluation results, treatment plan/rationale, home program and expected functional outcome  Patient, Family demonstrate understanding the education provided.       Subjective:         Social information:   Living Situation:single family home and lives with others    Occupation:on permanent disability    History of Present Illness:    Janis is a 58 y.o. female who presents to therapy today with complaints of memory deficits. Date of onset/duration of symptoms is 12/3/2018. This is a brief hospital course note from the hospital discharge summary report dated 12/9/2018: Janis Yin is a 58 y.o. female with a PMH significant for metastatic endometrial cancer status post hysterectomy, diagnosed in February of 2018. 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 Follow up imaging revealed multifocal progression and she received whole brain radiation. A CT scan on September 21 showing no evidence of disease in the chest, abdomen, and pelvis. All of the aforementioned care was provided in another state. She moved to [place] on October 16. She was doing well until a few weeks prior to admission when she started to have some neurologic symptoms again.  More recently she has been falling which prompted an ER evaluation.  Imaging showed resolution of the cerebellar tumors but persistence of tumors extending from the calvarium/steroid at the previous resection site. She underwent RE-DO RIGHT CRANIECTOMY WITH REMOVAL OF TUMOR WITH STEALTH on 12/3/2018 by Dr. Luis Christine.  Surgery was without complications.         Pertinent history includes: endometrial cancer, malignant neoplasm metastic to brain, HTN, hypothyroid, depression, anxiety, anemia    Patient presents as alert and cooperative during the session.  Patient reports pain at 6 out of 10 and  pain located in head     Objective:      Examination    Oral motor function was not impaired.    Motor speech was not impaired.   Speech intelligibility was approximately 100 % at the sentence level    Voice was not impaired.     Trach/Vent: N/A    Portions of the [place] Test for Differential Diagnosis of Aphasia along with The Reading Comprehension Battery of Aphasia were administered.    Auditory Comprehension:    Patient follows 3 step directions with 100 % accuracy, and complex directions with 60% accuracy.  Patient answers  moderate yes/no questions with 100 % accuracy, and complex questions with 80% accuracy.  Patient can follow  moderate conversation.     Reading Comprehension:  Patient can comprehend  simple paragraphs with 100 % accuracy.  Patient can orally read  simple paragraphs with 100 % accuracy.    Verbal Expression:  Patient completes confrontation naming task with 100 % accuracy.  Patient  can form sentences in conversation that are intact for syntax and semantics.  Patient can form sentences to describe with 100 % accuracy.  Patient can form sentences to explain with 40 % accuracy. Patient had some difficulty explaining the meanings of proverbs. She tended to be more literal and had difficulty explaining abstract concepts/thoughts.  Patient was able to name 17 animals in 1 minute.  Patient was able to produce multiple sentences using words with multiple meanings with 100% accuracy, but required increased time to formulate the sentences.    Written Expression:  Patient can write  name and address with 100 % accuracy.  Patient can write single words with 100 % accuracy.  Patient can write sentences with 100 % accuracy.    Cognition: Patient is oriented to month, day of month, year, day of week, time, place, city, state, and situation.  Patient was able to count by 2's up to 20 with 100% accuracy. She was able to count backwards from 20 to 1 with 100% accuracy. She counted by 7's up to 49 with  100% accuracy. She named every other month with 100% accuracy and named the last 6 months in reverse order with 100% accuracy. Further cognitive assessment will be completed next session.       Interventions Today  Interventions MINUTES   Speech - Language 58   Cognition    Dysphagia    Assistive technology    Voice            Total 58          Kerri Faith MS, CCC-SLP  12/24/2018

## 2021-06-22 NOTE — ANESTHESIA PREPROCEDURE EVALUATION
Anesthesia Evaluation      Patient summary reviewed   No history of anesthetic complications     Airway   Mallampati: II  Neck ROM: full   Pulmonary - normal exam    breath sounds clear to auscultation  (+) a smoker  (-) sleep apnea                         Cardiovascular - normal exam  (+) hypertension, ,     ECG reviewed  Rhythm: regular  Rate: normal,         Neuro/Psych    (+) depression, anxiety/panic attacks,     Comments: Forgetfulness  Falls  Vasogenic edema    Endo/Other    (+) hypothyroidism,   (-) no diabetes, not pregnant     Comments: Endometrial CA with mets to brain    GI/Hepatic/Renal - negative ROS           Dental - normal exam                        Anesthesia Plan  Planned anesthetic: general endotracheal    ASA 3   Induction: intravenous   Anesthetic plan and risks discussed with: patient, parent/guardian and child/children  Anesthesia plan special considerations: antiemetics, arterial catheterization, IV therapy two IVs,   Post-op plan: other (ICU)  DNR/DNI status   DNR/DNI status discussed with patient.  Plan is for suspension of DNR (OR and PACU)

## 2021-06-22 NOTE — ANESTHESIA POSTPROCEDURE EVALUATION
Patient: Janis Yin  RE-DO RIGHT CRANIECTOMY WITH REMOVAL OF TUMOR WITH STEALTH  Anesthesia type: general    Patient location: PACU  Last vitals:   Vitals:    12/03/18 1845   BP:    Pulse: (!) 51   Resp: 12   Temp:    SpO2: 99%     Post vital signs: stable  Level of consciousness: awake and responds to simple questions  Post-anesthesia pain: pain controlled  Post-anesthesia nausea and vomiting: no  Pulmonary: unassisted, return to baseline  Cardiovascular: stable and blood pressure at baseline  Hydration: adequate  Anesthetic events: no    QCDR Measures:  ASA# 11 - Tamika-op Cardiac Arrest: ASA11B - Patient did NOT experience unanticipated cardiac arrest  ASA# 12 - Tamika-op Mortality Rate: ASA12B - Patient did NOT die  ASA# 13 - PACU Re-Intubation Rate: ASA13B - Patient did NOT require a new airway mgmt  ASA# 10 - Composite Anes Safety: ASA10A - No serious adverse event    Additional Notes:

## 2021-06-22 NOTE — PROGRESS NOTES
"  [facility] Radiation Oncology Consult Note    Patient: Janis Yin  MRN: [id number]  Date of Service: 01/02/2019    Assessment / Impression     1. Brain metastases (H)  Ambulatory referral to Oncology Psychotherapist   2. Postoperative pain       Cancer Staging  No matching staging information was found for the patient.  ECOG Peformance Status  ECOG Performance Status: 1  Distress Assessment Score: 3    Plan:   1. Patient with extensive dural involvement s/p resection. She has just finished WBRT on 10/10/2018. We discussed possibility of re-irradiation of whole brain. Patient understands that given her recurrence and dural involvement after such short interval, re-irradiation would likely give side effects with little benefit.  She decided not pursue additional treatment XRT or chemotherapy at this time.   2. She has appointment for palliative care 1/15/2019 which I encouraged her to keep. I have also provided her with referral to Carmen Vasquez.   3. Will provide patient with refill of Oxycodone for pain control. Encouraged her to take with Ibuprofen. Elevate head of bed. She may require low dose steroid in future.   4. Patient describes \"tremors\" but think these are partial seizures in right hand, no h/o generalized seizure, continue Keppra 1000 BID  5. Patient and family with some questions regarding cannabis. Will certify for cannabis program.   6. Will update medical oncology, she has declined scan and dose not want chemotherapy.  Seen  By Dr Bianchi from gyn onc no further follow up required.   7. Discuss with neurosurgery regarding travel.   8. Follow up with myself on 1/17/2018.     Face to face time  60 minutes with > 75% spent on consultation, education and coordination of care.    Intent of Therapy: Palliative     Side effects that may occur during or within weeks after Radiation Therapy      Fatigue and general weakness    Headache and scalp irritation    Loss of hair    Nausea, vomiting, and decrease " in appetite    Darkening, irritation, itchiness, redness, dryness, peeling, thickening, scabbing, and ulceration of the scalp, neck and forehead    Side effects that may occur months or years after Radiation Therapy      Development of another tumor or cancer           Dizziness and balance problems    Decrease in hormone production    Brain inflammation or necrosis that may cause various neurologic symptoms    Seizures    Decrease in memory and thinking abilities    Decrease in hearing and feeling of ear congestion    Eye dryness and irritation    Loss of vision    Cataracts in the eyes    Poor healing after a trauma or surgery in the irradiated area    Facial numbness, pain and weakness    The risks, benefits and alternatives to radiation therapy were outlined with the patient. All questions were answered.    Subjective:      HPI: Janis Yin is a 58 y.o. female with brain mets from endometrial primary.    The patient is from [place] and was initially diagnosed with March 2018 with endometrial cancer and treated with TLH/BSO. In June 2018 she was found to have recurrent endometrial cancer with metastasis to the brain. She underwent craniotomy June 2018 with Gamma Knife, 18 Gy to resection cavity. The patient started Carbo/Taxol chemotherapy.     After 3 cycles of chemotherapy, the patient was found to have multifocal recurrence of brain mets on follow up scans and subsequently received whole brain, 3750 Gy in 15 fractions from 9/20/2018 - 10/10/2018. She stopped chemotherapy at that time and moved to [place] for further cares and family support. Late November 2018 the patient began having falls with left sided foot drop. She was found to have enhancement in the posterior right frontal region, 2.2 x 3.5 x 1.6 cm, as well as a lobulated lesion lateral to this which measured 1.6 x 1.5 x 1.8 cm.     The patient underwent re-do right craniectomy on 12/3/2018, her pathology returned with metastatic poorly  differentiated carcinoma involving at the excisional site, meninges, and dura. No skull involvement.     The patient presents to discuss possibilities of radiation therapy. Today she is doing well. She denies any history of seizures, has been taking Keppra 1000mg two times a day since her diagnosis. No significant headaches although diffuse and somewhat throbbing pain over incisional site, 6-8 in severity at times. She is taking Tylenol/Ibuprofen which helps for symptom relief. Tremor in right hand  which likely is a focal seizure. No incontinence, no LOC,  No focal weakness.     Prior Radiation: Yes  Concurrent Chemotherapy: No    Current Outpatient Medications   Medication Sig Dispense Refill     acetaminophen (TYLENOL) 325 MG tablet Take 2 tablets (650 mg total) by mouth every 4 (four) hours as needed. (Patient taking differently: Take 500 mg by mouth every 6 (six) hours as needed.       )  0     b complex vitamins tablet Take 1 tablet by mouth daily.       citalopram (CELEXA) 20 MG tablet Take 20 mg by mouth daily.       famotidine (PEPCID) 20 MG tablet Take 1 tablet (20 mg total) by mouth 2 (two) times a day.  0     glucosamine sulfate 500 mg Tab Take 2 capsules by mouth daily.       hydroCHLOROthiazide (HYDRODIURIL) 25 MG tablet Take 25 mg by mouth daily.       levETIRAcetam (KEPPRA) 1000 MG tablet Take 1 tablet (1,000 mg total) by mouth 2 (two) times a day. 60 tablet 1     levothyroxine (SYNTHROID, LEVOTHROID) 50 MCG tablet Take 1 tablet by mouth daily.       LORazepam (ATIVAN) 0.5 MG tablet Take 0.5 mg by mouth every 8 (eight) hours as needed for anxiety .             memantine (NAMENDA) 10 MG tablet Take 10 mg by mouth 2 (two) times a day.       multivitamin (ONE A DAY) per tablet Take 3 tablets by mouth daily.       senna (SENOKOT) 8.6 mg tablet Take 1 tablet by mouth 2 (two) times a day as needed.       ondansetron (ZOFRAN) 8 MG tablet Take 8 mg by mouth as needed.       oxyCODONE (ROXICODONE) 5 MG  immediate release tablet Take 1 tablet (5 mg total) by mouth 3 (three) times a day as needed. 21 tablet 0     No current facility-administered medications for this visit.      Past Medical History:   Diagnosis Date     Anemia      Anxiety      Chronic eczematous otitis externa of both ears      Depression      Endometrial cancer (H)     Stage 4 endometrial CA, metastases to brain     Falls      Forgetfulness     pt. reports occasional forgetfulness     Hx antineoplastic chemo      Hx of radiation therapy      Hypertension      Hypokalemia      Hypothyroid      Malignant neoplasm metastatic to brain (H)      Past Surgical History:   Procedure Laterality Date     CRANIOTOMY  06/2018     HYSTERECTOMY  03/2018     PORTACATH PLACEMENT  07/18/2018     DE STEREOTACTIC BRAIN BX,ASPIR,EXC Right 12/3/2018    Procedure: RE-DO RIGHT CRANIECTOMY WITH REMOVAL OF TUMOR WITH STEALTH;  Surgeon: Luis Christine MD;  Location: [facility];  Service: Neurosurgery     SHOULDER SURGERY Left     humerus fx     Lisinopril; Morphine; and Strawberry  Family History   Problem Relation Age of Onset     Hypothyroidism Mother      Valvular heart disease Mother      Aortic aneurysm Father      Hypertension Maternal Grandmother      Colon cancer Maternal Grandmother      Diabetes Paternal Grandmother      Stroke Paternal Grandfather      Coronary artery disease Paternal Grandfather      Colon cancer Sister      Social History     Socioeconomic History     Marital status:      Spouse name: Not on file     Number of children: Not on file     Years of education: Not on file     Highest education level: Not on file   Social Needs     Financial resource strain: Not on file     Food insecurity - worry: Not on file     Food insecurity - inability: Not on file     Transportation needs - medical: Not on file     Transportation needs - non-medical: Not on file   Occupational History     Not on file   Tobacco Use     Smoking status: Former  Smoker     Packs/day: 0.25     Years: 30.00     Pack years: 7.50     Types: Cigarettes     Last attempt to quit: 2018     Years since quittin.3     Smokeless tobacco: Never Used   Substance and Sexual Activity     Alcohol use: No     Frequency: Never     Drug use: No     Sexual activity: Not on file   Other Topics Concern     Not on file   Social History Narrative     Not on file        Review of Systems:        General  Constitutional (WDL): Exceptions to WDL  Fatigue: Fatigue relieved by rest  EENT  Eye Disorder (WDL): All eye disorder elements are within defined limits  Ear Disorder (WDL): All ear disorder elements are within defined limits  Respiratory       Respiratory (WDL): Within Defined Limits  Cardiovascular  Cardiovascular (WDL): All cardiovascular elements are within defined limits  Endocrine     Gastrointestinal  Gastrointestinal (WDL): All gastrointestinal elements are within defined limits  Musculoskeletal  Musculoskeletal and Connetive Tissue Disorders (WDL): All Musculoskeletal and Connetive Tissue Disorder elements are within defined limits  Integumentary               Integumentary (WDL): Exceptions to WDL  Neurological  Neurosensory (WDL): All neurosensory elements are within defined limits  Psychological/Emotional   Patient Coping: Accepting  Hematological/Lymphatic  Lymph (WDL): All lymph disorder elements are within defined limits  Dermatologic     Genitourinary/Reproductive  Genitourinary (WDL): All genitourinary elements are within defined limits  Reproductive     Pain              Currently in Pain: Yes  Pain Score (Initial OR Reassessment): 3  Pain Frequency: Constant/continuous  Location: incision, scalp  Pain Intervention(s): Home medication(ibuprofen & tylenol)  Response to Interventions: good   AUA Assessment                    Accompanied by  Accompanied by: Family Member(, and sister)      Objective:     Physical Exam    Vitals:    19 0850   BP: 108/59   Pulse: 76    Temp: 98.7  F (37.1  C)   TempSrc: Oral   SpO2: 99%       GENERAL: No acute distress. Cooperative in conversation.   HEENT: Pupils are equal, round and reactive. Oromucosa is clean and intact. No ulcerations or mucositis noted. No bleeding noted. Post surgical changes noted over scalp, healing well, no evidence of infection.   RESP: Normal respiratory rate.  CV: Regular, rate and rhythm.  ABD: Soft, nontender.  MUSCULOSKELETAL: No palpable points of tenderness.   PSYCH: Within normal limits. No depression or anxiety.  SKIN: Warm dry intact.   LYMPH: No cervical, supraclavicular lymphadenopathy.  EXTREMITIES: No edema .  NEUROLOGIC: CNII-XII symmetric, normal strength, sensation and reflexes throughout, gait normal      Recent Labs: No results found for this or any previous visit (from the past 168 hour(s)).    Imaging: Imaging results 30 days: Mr Brain With Without Contrast    Result Date: 12/25/2018  [facility] RADIOLOGY EXAM: MR BRAIN W WO CONTRAST LOCATION: [facility] DATE/TIME: 12/24/2018 2:25 PM INDICATION: Post op diagnostic and radiation planning protocol, brain mets resection 12-3-18, after staple removal 12-24-18 COMPARISON: MRI brain on 12/03/2018. CONTRAST: Gadavist 7.0 TECHNIQUE: Multiplanar multisequence head MRI without and with intravenous contrast including dynamic susceptibility contrast perfusion imaging. FINDINGS: No abnormal restricted diffusion to suggest acute infarct. Postsurgical changes interval right-sided craniotomy for resection of previously demonstrated enhancing lesions within the right frontal lobe. Persistent irregular nodular contrast enhancement within the midline and right parasagittal frontal frontal lobe along the periphery of the presumed resection cavity extending to the superior sagittal sinus. Suspect that the contrast enhancement represents an evolving postsurgical change, however given the appearance, there may be a component of residual tumor. Recommend  attention this region on follow-up imaging. There is persistent confluent signal abnormality within the subcortical and deep white matter of the right frontal lobe which likely represents a combination of vasogenic edema and/or gliosis. The right frontal lobe contents extend into the craniotomy defect.  No definite increased rCBV on perfusion imaging. Foci of gradient susceptibility within the resection cavity which likely represents blood products. No new foci of abnormal contrast enhancement within the brain parenchyma. Multiple foci of gradient hypointensity within the left lentiform nucleus, left cerebellar hemisphere and within the cerebral hemispheres bilaterally which are unchanged. These most likely represent sequela of prior microhemorrhage or microcalcification.     CONCLUSION:  1.  Postsurgical changes interval right-sided craniotomy for resection of previously demonstrated enhancing lesions within the right frontal lobe. There is extension of the right frontal lobe into the craniotomy defect. Irregular nodular contrast enhancement is demonstrated along the margins of the resection cavity in the right parasagittal right frontal lobe extending to the superior sagittal sinus. This most likely represents evolving postsurgical change, however a component of residual tumor could have a similar appearance. Recommend attention this region on follow-up examination. 2.  No new foci of contrast enhancement. 3.  Stable confluent signal abnormality within the right frontal lobe which most likely represents a combination of vasogenic edema and/or gliosis. 4.  No evidence of acute intracranial hemorrhage or infarct.       Pathology:   Results for orders placed or performed during the hospital encounter of 12/03/18 (from the past 8760 hour(s))   Surgical Pathology Exam   Result Value    Case Report      Surgical Pathology Report                         Case: [id number]                                    Authorizing  Provider:  Luis Christine MD     Collected:           12/03/2018 1408              Ordering Location:     [facility] OR   Received:            12/03/2018 1439              Pathologist:           Joshua Art MD                                                        Specimens:   A) - Bone, Craniotomy Bone (Look for Endometrial CA)                                                B) - Brain/Meninges, Tumor Resection, Endometrial Cancer                                            C) - Brain/Meninges, Tumor Resection, BRAIN TUMOR, METASTATIC ENDOMETRIAL CARCINOMA                 D) - Tissue, DURA                                                                                   E) - Bone, CRANIAL BONE OVER THE ANTERIOR TUMOR                                                     F) - Brain/Meninges, Tumor Resection, LATERAL ANTERIOR BRAIN TUMOR , METASTATIC                      ENDOMETRIAL CARCINOMA                                                                               G) - Bone, ANTERIOR LATERAL CRANIAL BONE                                                   Final Diagnosis      A) BONE, SKULL, CRANIOTOMY, EXCISION:       - NEGATIVE FOR METASTATIC CARCINOMA    B) BRAIN, MENINGES, EXCISION:       - METASTATIC POORLY DIFFERENTIATED CARCINOMA WITH EXTENSIVE NECROSIS, SEE COMMENT    C) BRAIN, TUMOR, METASTATIC ENDOMETRIAL CARCINOMA, EXCISION:       - METASTATIC POORLY DIFFERENTIATED CARCINOMA WITH EXTENSIVE NECROSIS, SEE COMMENT       - SEE MICROSCOPIC DESCRIPTION    D) DURA, EXCISION:       - METASTATIC POORLY DIFFERENTIATED CARCINOMA WITH EXTENSIVE NECROSIS, SEE COMMENT    E) BONE, CRANIAL, OVER ANTERIOR TUMOR, EXCISION:       - NEGATIVE FOR CARCINOMA    F) BRAIN, MENINGES, TUMOR RESECTION, LATERAL ANTERIOR BRAIN TUMOR, EXCISION:       - METASTATIC POORLY DIFFERENTIATED CARCINOMA WITH EXTENSIVE NECROSIS, SEE COMMENT    G) BONE, ANTERIOR LATERAL CRANIAL, RESECTION:       - NEGATIVE FOR CARCINOMA      MCSS    Comment      The findings demonstrate a metastatic poorly differentiated carcinoma which appears morphologically and at least partially immunohistochemically distinct from the outside reports of the patient's history of a grade 2 endometrial carcinoma. The tumor does not demonstrate the expected morphology of a grade 2 endometrial carcinoma in this specimen. The  poorly differentiated carcinoma focally co-expresses CK7 and CK20, is positive for CDX2, p63 (patchy), synaptophysin (patchy) and GATA3 nuclear staining. This profile is not entirely specific and could still represent a metastasis from her endometrial primary, especially if other primary sites are ruled out, however, ER and PAX8 are both negative. Other possible primary sites for the metastatic carcinoma include urothelial carcinoma (including urachal), pulmonary (intestinal subtype), pancreatic, biliary, upper gastrointestinal or midline carcinoma origins. Colonic and breast primaries are less likely, but have been demonstrated to  infrequently coexpress CK7 and CK20. Given the PAX8 and ER are both negative an ovarian (mucinous subtype) is also less likely.     The patient's original slides are not available for morphologic comparison, however, I am willing to review these outside slides if they are requested to be sent to us for review. Clinical and radiologic correlation is recommended.    These results are discussed with Dr. Luis Christine and Dr. Justin Emanuel on 12/7/18 at 1500 and 1510, respectively.    Microscopic Description      B,C,D,F)The sections demonstrate a metastatic poorly differentiated carcinoma with extensive necrosis. The carcinoma is growing in sheets, ribbons and solid nests of cells with foci suggestive of a papillary growth pattern. Focal squamous and neuroendocrine differentiation is seen. Immunohistochemical stains (CD56, synaptophysin, TTF-1, CK7 on block C1) and (CK7, CK20, ER, WT1, GATA3, p63, PAX8, CDX2, pankeratin  "on block F) are performed with appropriate positive controls. The carcinoma is positive for pankeratin (strong, diffuse), CK7 (patchy) on both blocks, CK20 (patchy), p63 (patchy, moderate), CDX2 (moderate), synaptophysin (patchy), GATA3 (patchy, nuclear). CD56 is negative for cytoplasmic staining and shows patchy, weak to moderate nuclear staining. The carcinoma is negative for WT1 nuclear staining (patchy, cytoplasmic) and negative for TTF-1, PAX8 and ER (0% staining).    A,E,G) Performed.    Clinical Information Pre-op Diagnosis:  Brain tumor (H) [D49.6]    Gross Description      A) The specimen is received in formalin, labeled with the patient's name and \"craniotomy bone.\"  It consists of two fragments of bone measuring 2 x 1 x 0.5 cm and 5 x 0.8 x 0.8 cm. All of the bone fragments are submitted in decal prior to further sectioning.     B) The specimen is received without fixative, labeled with the patient's name and \"brain/meniniges.\" It consists of three ovoid fragments of soft pink tissue, each 3-4 mm in length and each 2 mm in greatest diameter. The tissue is totally submitted for frozen section analysis. JPL:[facility]    FROZEN SECTION DIAGNOSIS:   B) METASTATIC CARCINOMA - [facility], REPORTED VERBALLY TO DR LEONARD AT 1455 ON 12/3/18.     The frozen section residue is totally submitted in one cassette. JPL:db     C) The specimen is received in formalin, labeled with the patient's name and \"brain tumor, metastatic endometrial carcinoma.\" It consists of multiple hemorrhagic brown fragments measuring 2 x 2 x 0.5 cm in aggregate. TE-1C     D) The specimen is received in  formalin, labeled with the patient's name and \"dura.\" It consists of a 3.5 x 2.2 x 0.4 cm portion of dura with focal, firm areas with yellow discoloration. RS-1C    E) The specimen is received in formalin, labeled with the patient's name and \"cranial bone over the anterior tumor excision.\" All of the bone fragments are submitted in decal prior to further " "sectioning.     F) The specimen is received in formalin, labeled with the patient's name and \"brain meninges tumor resection, lateral anterior brain tumor.\" It consists of a 2.5 x 1.5 x 0.9 cm brown-yellow nodule with attached presumed dura, serially sectioned. RS-1C    G) The specimen is received in formalin, labeled with the patient's name and \"bone, anterior lateral cranial.\" There is an irregular bone fragment measuring 4 x 2 x 0.5 cm. All of the bone fragments are submitted in decal prior to further sectioning. TRACY:corrine    Special Stains      Note - Estrogen Receptor Immunoperoxidase Stain:  This stain was done using the following criteria:    Specimen fixative:   Formalin-fixed paraffin-embedded sections    Detection system:   Biotin-free multimer-based technology detection system ([facility])    Retrieval method:    CC1 pretreatment; a susan-based buffer with a slightly basic PH used at an elevated                                    temperature (95+5 degrees C)     Clone:                      Estrogen receptor - SP1, Rabbit monoclonal ([facility])     Scoring method:      Intensity of nuclear stain, strong vs weak vs absent; % of cells stained    Charges      CPT: 88307 x7, 88311 x3, 48647, 88342 x2, 88341 x10, 30481  ICD-10: C79.9    Result Flag Malignant (!)     Comment:   SPECIMEN PROCESSING:    All histology slide preparation and stains; and cytology slide preparation, staining, and cytotechnologist screening done at [facility] are performed at [facility], [address] [place] [address], [id number], with final interpretation, frozen section analysis, and cytology adequacy assessment at indicated laboratory.             I, Janis Jarrett MD personally performed the services described in this documentation, as scribed by Elvin Penaloza in my presence, and it is both accurate and complete.    Signed by: Janis Jarrett MD, MPH     "

## 2021-06-22 NOTE — ANESTHESIA PROCEDURE NOTES
Arterial Line  Reason for Procedure: hemodynamic monitoring and multiple ABGs  Patient location during procedure: OR pre-induction  Start time: 12/3/2018 11:20 AM  End time: 12/3/2018 11:28 AM  Staffing:  Performing  Anesthesiologist: Roxann Alvarez MD  Sterile Precautions:  sterile barriers used during insertion: cap, mask, sterile gloves, large sheet, and hand hygiene used.  Arterial Line:   Immediately prior to procedure a time out was called to verify the correct patient, procedure, equipment, support staff and site/side marked as required  Laterality: left  Location: radial  Prepped with: ChloroPrep    Needle gauge: 20 G  Number of Attempts: 1  Secured with: tape, transparent dressing and pressure dressing  Flushed with: saline  1% lidocaine local anesthesia used for skin prep.   See MAR for additional medications given.

## 2021-06-22 NOTE — TELEPHONE ENCOUNTER
calling to inquire if patient is okay to fly w/o bone flap, and what protective measures if any should they take.     Pls call  Bill at [phone].

## 2021-06-22 NOTE — PROGRESS NOTES
Preop Assessment: Janis Yin presents for pre-op review.  Surgeon: Dr. Christine  Name of Surgery: RE-DO RIGHT CRANIECTOMY WITH REMOVAL OF TUMOR WITH STEALTH  Diagnosis: brain tumor  Date of Surgery: 12-3-18  Time of Surgery: 1100  Hospital: [facility]  H&P: 18  History of ASA, NSAIDS, vitamin and/or herbal supplements within 10 days: No  History of blood thinners: No  History of anti-seizure med's: Yes, on keppra since . Has never had a seizure  Review of systems: feels well today, denies headache    Last BM:18  Hx nausea/vomiting: denies  Hx urinary retention: denies  Pain management: not an issue  Patient confirmed they have help/assistance in place at home upon discharge: plans to recover at home with daughter    Diagnostics:  Labs: today: OK for surgery  CXR: not ordered  EK18:  NSR  Pre op MRI  And MRV , with fiducials: 0700, 0800  Films: MRI brain 18 in [place]    All questions answered regarding surgery and expected pre and postoperative course including rehabilitation phase.     Reviewed with patient: Arrive when instructed by pre op nurse when they call, nothing to eat or drink after midnight the night before surgery and bring all pertinent films to the hospital the day of surgery.  Continue to refrain from NSAIDS (Ibuprofen, Aleve, Naprosyn) ASA or over the counter herbal medication or supplements, anticoagulants and blood thinners.    Incentive Spirometer usage instructions provided.     Patient confirmed they have help/assistance in place at home upon discharge.     Patient was informed that we will provide up to 1 week prescription of pain medication for post-operative pain.     Surgical consent was signed.    Daisha Marcano RN

## 2021-06-22 NOTE — PROGRESS NOTES
[facility]  Discharge Summary    Patient Name: Janis Yin  Date: 1/3/2019  Referral Diagnosis: Brain tumor  Referring provider: Luis Christine MD  Visit Diagnosis:     ICD-10-CM    1. Cognitive communication deficit R41.841    2. Aphasia R47.01          Assessment:   Patient was seen for evaluation only due to patient going into hospice.     Goal Status:   Long Term Goals  Pt. will improve cognitive, linguistic skills to allow for completion of daily activities and interactions by: : 12 weeks     Short Term Goals  Pt. will generate 15 items: in 3 categories;with minimal cuing;with 90% accuracy;in 12 weeks (Not met)  Pt. will form sentences to explain, describe with : with minimal cuing;with 90% accuracy;in 12 weeks(Not met)  Pt will participate in a cognitive assessment to determine therapy needs and D/C needs by 2 weeks.(Not met)  Pt will improve recall of details of verbal paragraphs through use of memory strategies with 90% accuracy by 12 weeks.(Not met)  Pt will perform moderately complex deduction reasoning tasks and problem solving tasks with 90% accuracy by 12 weeks. (Not met)          Plan / Patient Education:   Speech therapist called the patient's spouse to determine future speech therapy visits and whether patient was receiving home care. He stated that they recently found out that there was nothing more to be done for her cancer and she was going into hospice soon. Thus, speech therapy will be discharged at this time.      Kerri Faith MS, CCC-SLP  1/3/2019

## 2021-06-22 NOTE — PROGRESS NOTES
Pt is here for a wound check s/p RE-DO RIGHT CRANIECTOMY WITH REMOVAL OF TUMOR WITH STEALTH on 12-3-18 by Dr. Christine.   Final path is pending currently.  See separate encounter Committee Review for plan.    She is seeing Dr. Emaneul 12-18-18 and will see Dr. Jarrett after her yissel are removed and she has her treatment planning MRI 12-24-18.     She reports she has incisional discomfort but not really headaches. Her word finding and memory issues are slowly getting better.  She was instructed to taper off decadron and to call if headache increases. She denies vision issues.      She is living with her daughter and family and is accompanied by her daughter today. They verbalized understanding of the plan.      Surgical wound WNL, staples and sutures intact, bone flat is out and there is no swelling.   - CDI, no signs of infection or skin breakdown.  Incision well-healed: good skin approximation, no redness or visible/palpable edema, no tenderness to palpation.  PT. AF, denies fever, chills or sweats.  Pt. reports that the symptoms are improved from pre-op.    Daisha Marcano RN

## 2021-06-22 NOTE — PROGRESS NOTES
[facility] Hematology and Oncology Progress Note    Patient: Janis Yin  MRN: [id number]  Date of Service: 12/18/2018      Assessment and Plan:    Cancer Staging  No matching staging information was found for the patient.     1.  Brain metastases:  MRI from November 23, when compared to September 18, showed stable tumors versus slight improvement in 1 of the tumors.  Much more edema on the November MRI.  Possibly post radiation change.  She is status post resection on December 3, 2018.  This appears to be metastatic endometrial cancer after reviewing her initial reports from her first brain tumor resection and hysterectomy.  She will meet with radiation oncology shortly after her next brain MRI on January 2, 2019.  At that time a decision will be made regarding further radiosurgery to the new resection bed (she has already had WBXRT).    2.  Endometrial cancer: Her most recent body imaging in September did not show any evidence of disease.  She had a small pulmonary nodule which was being followed.  At this point she has no clinical evidence of recurrent disease outside of the central nervous system.  Repeat imaging in mid January.  I explained to the patient and her family that her prognosis at this point will be determined by the course of her brain disease.  She certainly could consider systemic chemotherapy if she has a systemic recurrence with controlled brain metastases.    3.  Follow-up: We will see her towards the end of January with a CT chest, abdomen, and pelvis.    ECOG Performance   ECOG Performance Status: 1    Distress Assessment  Distress Assessment Score: 2    Pain  Currently in Pain: Yes  Pain Score (Initial OR Reassessment): 3  Location: incision 1.  Metastatic endometrial cancer    Diagnosis:    1.  Metastatic endometrial cancer:  Initially diagnosed in February 2018.  Per report she had a stage I, grade 2 tumor.  Brain metastases noted approximately 3 months after  diagnosis.    Treatment:    Initial treatment with hysterectomy and BSO on March 5, 2018.  Pathology showed endometrial adenocarcinoma, endometrioid type, FIGO grade 2.  Invasive to a depth of 5 mm in a myometrial thickness of 16 mm.  Positive lymphovascular invasion.  Negative margins.  7 right pelvic sentinel nodes were negative.  No adjuvant therapy was given.    Brain metastases discovered in June 2018.  Right frontal craniotomy June 8, 2018.  Pathology was read as metastatic adenocarcinoma, consistent with metastasis from the patient's known endometrial cancer.      Stereotactic radiosurgery to the resection cavity on June 26, 2018.  18 Gy given.    She then received 3 cycles of carboplatin and paclitaxel first dose July 25, 2018.  Cycle 3 given September 5, 2018.    New brain metastases seen on MRI September 18, 2018:     Subadjacent to the craniotomy site, there has been interval development of two extra-axial, dural-based lesions with heterogeneous enhancement measuring approximately 2.1 x 1.5 cm and 2.8 x 2.5 cm  in maximal AP by TV dimensions.      There are two small enhancing foci identified in the right occipital lobe, both measuring 4 mm  There is an additional 3 mm enhancing focus best seen on subtracted images within the left cerebellum    She then received whole brain radiation therapy: 3750 cGy at 250 cGy fractions (15 fractions total) were delivered September 20 - October 10, 2018.    Recurrent/persistent brain metastases seen on MRI November 23, 2018.  Mostly stable to possibly improved.  Vasogenic edema is much worse compared to September.  This could be posttreatment.    Resection of two brain metastases on 12/3/2018.  Pathology consistent with initial endometrial cancer.    Interim History:    Sheila returns today for follow-up visit.  She had surgery to resect brain metastases about 2 weeks ago.  She is feeling okay.  No headaches or new neurologic symptoms.  Denies nausea or vomiting.  No  seizure activity.    Review of Systems:    Constitutional  Constitutional (WDL): Exceptions to WDL  Fatigue: Fatigue relieved by rest  Neurosensory  Neurosensory (WDL): All neurosensory elements are within defined limits  Cardiovascular  Cardiovascular (WDL): All cardiovascular elements are within defined limits  Pulmonary  Respiratory (WDL): Within Defined Limits  Gastrointestinal  Gastrointestinal (WDL): All gastrointestinal elements are within defined limits  Genitourinary  Genitourinary (WDL): All genitourinary elements are within defined limits  Integumentary  Integumentary (WDL): All integumentary elements are within defined limits  Patient Coping  Patient Coping: Anxiety  Accompanied by  Accompanied by: Family Member    Past History:    Past Medical History:   Diagnosis Date     Anemia      Anxiety      Chronic eczematous otitis externa of both ears      Depression      Endometrial cancer (H)     Stage 4 endometrial CA, metastases to brain     Falls      Forgetfulness     pt. reports occasional forgetfulness     Hx antineoplastic chemo      Hx of radiation therapy      Hypertension      Hypokalemia      Hypothyroid      Malignant neoplasm metastatic to brain (H)      Physical Exam:    Recent Vitals 12/18/2018   Height -   Weight 147 lbs   BSA (m2) 1.76 m2   /55   Pulse 77   Temp 97.5   Temp src 1   SpO2 97   Some recent data might be hidden     General: patient appears stated age of 58 y.o.. Nontoxic and in no distress.   HEENT: Head: atraumatic, normocephalic. Sclerae anicteric.  Craniotomy incision looks okay.  Chest:  Normal respiratory effort  Cardiac:  No edema.   Abdomen: abdomen is non-distended  Extremities: normal tone and muscle bulk.  Skin: no lesions or rash. Warm and dry.   CNS: alert and oriented. Grossly non-focal.   Psychiatric: normal mood and affect.     Lab Results:    No results found for this or any previous visit (from the past 168 hour(s)).     Imaging:    Mr Brain With Without  Contrast    Addendum Date: 12/3/2018    ADDENDUM: Not mentioned in the original report but commented on in the previous exam is some enhancement within the parasagittal craniectomy defect (coronal postcontrast images 20 and 21 of series 25) as well as some enhancement along/within the anterior  craniotomy (coronal postcontrast images 22-24). This could reflect some involvement of the craniotomy and adjacent native bone by the underlying tumor. Findings were discussed with Dr. Luis Christine via telephone at 1130 hours on 12/3/2018.    Result Date: 12/3/2018  [facility] MRV HEAD W CONTRAST, MR BRAIN W WO CONTRAST 12/3/2018 9:24 AM INDICATION: Preoperative planning for tumor removal. TECHNIQUE: Noncontrast and contrast enhanced MRI of the brain. MR venogram of the brain with IV contrast. CONTRAST: 6.5 mL of gadobutrol intravenous contrast. COMPARISON: Brain MRI 11/23/2018. FINDINGS: MRI BRAIN: Previous right frontoparietal craniotomy. Deep to this there is masslike enhancement in the parasagittal posterior right frontal region estimated at 2.2 cm AP x 3.5 cm transverse x 1.9 cm in height, not significantly changed. Anterior, inferior, and lateral to this is a second lobulated lesion that measures 1.6 cm transverse x 1.5 cm craniocaudal x 1.8 cm AP, also not substantially changed. The parasagittal lesion abuts the superior sagittal venous sinus. Suspect very subtle extension into the venous sinus along the posterior aspect of the lesion (T2 hyperintense material on coronal T2 images 20 and 21). Extensive vasogenic edema within the right frontal lobe is unchanged. This is associated with 4 mm of leftward shift of the midline structures, slightly diminished versus previous. No hydrocephalus. No restricted diffusion to indicate acute infarct. Foci of signal loss on the susceptibility-weighted imaging in the left cerebellum, right occipital lobe, left putamen, left parietal and right frontal lobes are  unchanged. MRV BRAIN: The larger, more posterior parasagittal right frontal lesion abuts and is suspected to slightly invade the adjacent superior sagittal venous sinus (coronal 2D time-of-flight image 70, series 26). The sinus is mildly narrowed but remains patent. Elsewhere the super sagittal venous sinus is normal. Internal cerebral veins, straight, transverse, and sigmoid venous sinuses are patent.     CONCLUSION: MRI BRAIN: 1.  Enhancing lesions over the right frontal lobe are unchanged in size. These may be extraaxial invading the brain. Extensive associated vasogenic edema is unchanged. Mass effect is slightly diminished but not resolved. 2.  No acute infarct, hemorrhage, or hydrocephalus. MRV BRAIN: 1.  The larger more posterior lesion over the right parasagittal frontal lobe abuts and is suspected to slightly invade the superior sagittal venous sinus which is mildly narrowed but remains patent. 2.  Otherwise negative MR venogram of the brain.    Mr Brain With Without Contrast    Addendum Date: 11/24/2018    A comparison exam from 09/18/2018 is now available. The dominant lesion in the medial posterior right frontal lobe demonstrates new wisps of enhancement along the deep margins of the previously described lobulated extra-axial mass. These wispy inferiorly  directed bands of enhancement are worrisome for tumor progression and possible brain invasion. The dominant lesion itself is overall little changed in size but the bands of enhancement along the undersurface of this dominant lesion are new when compared  to prior. The second lesion overlying the right inferior frontal gyrus appears slightly smaller on the current exam when compared to prior, measuring 19 x 15 x 16 mm (AP x CC x TR), previously measuring 23 x 19 x 15 mm in the same dimensions. Vasogenic edema within the right frontal lobe has significantly progressed. The 7 mm right-to-left midline shift is increased from 2 mm on the prior exam. The  punctate enhancing lesion in the right occipital lobe with associated vasogenic edema seen on the prior exam is not appreciated on this current study.    Result Date: 11/24/2018  [facility] RADIOLOGY EXAM: MR BRAIN W WO CONTRAST LOCATION: [facility] DATE/TIME: 11/23/2018 1:33 AM INDICATION: Known stage 4 endometrial cancer with brain metastases. Recent falls today, discoordination, vertigo. COMPARISON: None. CONTRAST: Gadavist 7 mL. TECHNIQUE: Multiplanar multisequence head MRI without and with intravenous contrast including dynamic susceptibility contrast perfusion imaging. FINDINGS: INTRACRANIAL CONTENTS: Postop changes related to a high right parietal derek hole. Multifocal lobulated enhancing material is noted deep to this craniotomy extending from the dural surface and invading the underlying brain parenchyma. The medial component  measures up to 39 x 35 x 21 mm (TR by AP by CC). This is contiguous with enhancing material in the overlying calvarium. There is a 15 mm lobulated focus of abnormal enhancing material overlying the right middle frontal gyrus. There is smooth dural enhancement overlying the right frontal lobe. There is extensive edema in the underlying right frontal lobe. No acute hemorrhage. There is a 7 mm right-to-left midline shift. Scattered nonspecific T2/FLAIR hyperintensities within the cerebral white matter most consistent with mild chronic microvascular ischemic change. Ventricles and sulci are normal for age. Normal position of the cerebellar tonsils. SELLA: No significant abnormality accounting for technique. OSSEOUS STRUCTURES/SOFT TISSUES: Right parietal craniotomy. The major intracranial vascular flow voids are maintained. ORBITS: No significant abnormality accounting for technique. SINUSES/MASTOIDS: No significant paranasal sinus mucosal disease. No significant middle ear or mastoid effusion.     CONCLUSION: 1.  Deep to a previous craniotomy of the right parietal bone are  multifocal lobulated enhancing lesions extending into the brain parenchyma from the overlying dura worrisome for some residual or recurrent neoplasm. There is considerable vasogenic edema in the underlying right frontal lobe. 2.  7 mm right-to-left midline shift. NOTE: ABNORMAL REPORT THE DICTATION ABOVE DESCRIBES AN ABNORMALITY FOR WHICH FOLLOW-UP IS NEEDED.     Mrv Head With Contrast    Addendum Date: 12/3/2018    ADDENDUM: Not mentioned in the original report but commented on in the previous exam is some enhancement within the parasagittal craniectomy defect (coronal postcontrast images 20 and 21 of series 25) as well as some enhancement along/within the anterior  craniotomy (coronal postcontrast images 22-24). This could reflect some involvement of the craniotomy and adjacent native bone by the underlying tumor. Findings were discussed with Dr. Luis Christine via telephone at 1130 hours on 12/3/2018.    Result Date: 12/3/2018  [facility] MRV HEAD W CONTRAST, MR BRAIN W WO CONTRAST 12/3/2018 9:24 AM INDICATION: Preoperative planning for tumor removal. TECHNIQUE: Noncontrast and contrast enhanced MRI of the brain. MR venogram of the brain with IV contrast. CONTRAST: 6.5 mL of gadobutrol intravenous contrast. COMPARISON: Brain MRI 11/23/2018. FINDINGS: MRI BRAIN: Previous right frontoparietal craniotomy. Deep to this there is masslike enhancement in the parasagittal posterior right frontal region estimated at 2.2 cm AP x 3.5 cm transverse x 1.9 cm in height, not significantly changed. Anterior, inferior, and lateral to this is a second lobulated lesion that measures 1.6 cm transverse x 1.5 cm craniocaudal x 1.8 cm AP, also not substantially changed. The parasagittal lesion abuts the superior sagittal venous sinus. Suspect very subtle extension into the venous sinus along the posterior aspect of the lesion (T2 hyperintense material on coronal T2 images 20 and 21). Extensive vasogenic edema within the  right frontal lobe is unchanged. This is associated with 4 mm of leftward shift of the midline structures, slightly diminished versus previous. No hydrocephalus. No restricted diffusion to indicate acute infarct. Foci of signal loss on the susceptibility-weighted imaging in the left cerebellum, right occipital lobe, left putamen, left parietal and right frontal lobes are unchanged. MRV BRAIN: The larger, more posterior parasagittal right frontal lesion abuts and is suspected to slightly invade the adjacent superior sagittal venous sinus (coronal 2D time-of-flight image 70, series 26). The sinus is mildly narrowed but remains patent. Elsewhere the super sagittal venous sinus is normal. Internal cerebral veins, straight, transverse, and sigmoid venous sinuses are patent.     CONCLUSION: MRI BRAIN: 1.  Enhancing lesions over the right frontal lobe are unchanged in size. These may be extraaxial invading the brain. Extensive associated vasogenic edema is unchanged. Mass effect is slightly diminished but not resolved. 2.  No acute infarct, hemorrhage, or hydrocephalus. MRV BRAIN: 1.  The larger more posterior lesion over the right parasagittal frontal lobe abuts and is suspected to slightly invade the superior sagittal venous sinus which is mildly narrowed but remains patent. 2.  Otherwise negative MR venogram of the brain.      Signed by: Tarun Emanuel MD

## 2021-06-22 NOTE — PROGRESS NOTES
Pt is here for staple and suture removal s/p  RE-DO RIGHT CRANIECTOMY WITH REMOVAL OF TUMOR WITH STEALTH on 12-3-18 by Dr. Chritsine.   Final path = METASTATIC POORLY DIFFERENTIATED CARCINOMA WITH EXTENSIVE NECROSIS. Slides and blocks from original surgeries in [place] were requested for the pathologist to review.   See separate encounter Committee Review for plan.    She saw Dr. Emanuel 12-18-18 and will see Dr. Jarrett after her yissel are removed and she has her treatment planning MRI today after staple removal.     She wonders if she is getting a cold or sinus infection as she feels some pressure in her nose and sinuses.  She has a slight non productive cough but no nasal drainage. She feels like her staples and sutures are really tight and has discomfort in the incisional area.     Surgical wound WNL, no bulging where bone flap left out - CDI, no signs of infection or skin breakdown.  Incision well-healed: good skin approximation, no redness or visible/palpable edema, no tenderness to palpation.  PT. AF, denies fever, chills or sweats.      Staples and sutures- intact removed without difficulty. Wound prepped with Betadine before and after removal.  Surrounding skin has no signs of breakdown.  Verbal instructions regarding incision care are given.  Pt. advised to call us if any s/s of infection noted - all discussed in detail.      Daisha Marcano RN

## 2021-06-22 NOTE — PROGRESS NOTES
Pt here ambulatory with her sister and her  for consult with Dr. Jarrett and likely CT simulation for radiation planning. She states she is feeling fairly well since brain surgery, incision/scalp/skull are painful but tolerable with alternating ibuprofen and acetaminophen. She's had radiation in the past, but I did review the routine for RT including consult, simulation with mask, daily treatments, MD visits. She verbalized her understanding. I provided the new patient RT folder as well and encouraged calls if needed.

## 2021-06-22 NOTE — TELEPHONE ENCOUNTER
Patient , Bear, called with questions and asked for a call back.  Called Bear back and he has questions that are related to patients post op course.  Redirected Bear to call the neurosurgery group to answer those questions.  He verbalized understanding and will give them a call.  Message sent to the neurosurgery group to make them aware that patient has questions about swelling in face and neck.

## 2021-06-22 NOTE — ANESTHESIA CARE TRANSFER NOTE
Last vitals:   Vitals:    12/03/18 1713   BP: 136/74   Pulse: (!) 55   Resp: 10   Temp: 36.8  C (98.3  F)   SpO2: 100%     Patient's level of consciousness is drowsy  Spontaneous respirations: yes  Maintains airway independently: yes  Dentition unchanged: yes  Oropharynx: oral airway in place    QCDR Measures:  ASA# 20 - Surgical Safety Checklist: WHO surgical safety checklist completed prior to induction    PQRS# 430 - Adult PONV Prevention: 4558F - Pt received => 2 anti-emetic agents (different classes) preop & intraop  ASA# 8 - Peds PONV Prevention: NA - Not pediatric patient, not GA or 2 or more risk factors NOT present  PQRS# 424 - [place]ika-op Temp Management: 4559F - At least one body temp DOCUMENTED => 35.5C or 95.9F within required timeframe  PQRS# 426 - PACU Transfer Protocol: - Transfer of care checklist used  ASA# 14 - Acute Post-op Pain: ASA14B - Patient did NOT experience pain >= 7 out of 10

## 2021-06-22 NOTE — PROGRESS NOTES
[facility] Discharge Summary  Patient Name: Janis Yin  Date: 2/13/2019  Referral Diagnosis: brain tumor  Referring provider: Syed Weiss MD  Visit Diagnosis:   1. Decreased activities of daily living (ADL)     2. Cognitive changes         Goal status: Unable to assess as patient did not return.    Patient was seen for 1 visit.     The patient will need a new referral to resume.    Thank you for your referral.  Lizet Sherman  2/13/2019   10:42 AM      [facility]   ADL Initial Evaluation    Patient Name: Janis Yin  Date of evaluation: 12/13/2018  Referral Diagnosis: brain tumor  Referring provider: Syed Weiss MD  Visit Diagnosis:     ICD-10-CM    1. Decreased activities of daily living (ADL) R68.89    2. Cognitive changes R41.89        Assessment:      Pt. is appropriate for skilled OT intervention as outlined in the Plan of Care (POC).    Goals:  Patient will be independent with home exercise program in: 2 weeks  Pt will: use a compensatory strategy(ie. automatic stove shut off or timer) to remember to turn stove off after use;in 12 weeks  Pt will: perform HEP consistently to increase awareness of the left UE with decrease incidents of dropping items in 12 weeks  Pt will: continue to use day planner to help with organization and memory       Patient's expectations/goals are realistic.    Barriers to Learning or Achieving Goals:  No Barriers.       Plan / Patient Instructions:        Plan of Care:   Communication with: Referral Source;Patient Caregiver  Patient Related Instruction: Nature of Condition;Treatment plan and rationale;Basis of treatment;Precautions;Expected outcome  Times per Week: 1  Number of Weeks: 12  Number of Visits: 12  Therapeutic Exercise: Strengthening  Neuromuscular Reeducation: other  Neuromuscular Re-education: coordination and awareness of left UE  Functional Training (ADL's): ADL's;self care;safety procedures;compensatory  training;instructions for equipment  Cognitive Skills Development:        Plan for next visit: if patient does return for OT follow up, plan to test  and pinch strength.     Subjective:        Social information:   Living Situation: patient and her  moved in with her daughter since october   Occupation:unknown occupation   Work Status:Unable to work due to symptoms     History of Present Illness:    Janis is a 58 y.o. female who presents to therapy today with complaints of cognitive changes. She was diagnosed with endometrial cancer and then in 2018 began having headaches and difficulty with typing and went to the ED. CT revealed metastatic cancer to her brain and she had a craniotomy with tumor resection at that time.  She then presented to the ED in November with frequent falls and additional metastases discovered. She had another craniotomy and tumor resection. She began having cognitive changes after surgery and was put on a steroid course with significant improvement since starting that.  Date of onset/duration of symptoms is 2018. Onset was sudden.She denies history of similar symptoms. She describes previous level of function as not limited. Functional limitations are described as occurring with decreased cognitive ability and function of left UE for ADL's and IADL's.    Pain Ratin  Pain description: N/A       Objective:      Note: Items left blank indicates the item was not performed or not indicated at the time of the evaluation.    OUTCOME MEASURE:  No Data Recorded    ADL Examination  1. Decreased activities of daily living (ADL)     2. Cognitive changes       Precautions/Restrictions: None  Involved side: Left  Posture Observation:      General standing posture is fair.    Prior Level of Function: independent                            Current level of function           I  SBA  MIN  MOD MAX DEP   Equipment   Transfers           Bed x         Toilet  x         Tub/shower x          Car  x         Bed mobility x         ADL's          Eating x         Grooming x         Bathing x         UE dressing x         LE dressing x         Toileting x         IADL's          Meal prep x         House work x         Laundry  x         Telephone x         Driving      x           N/A   Finances        does this   Outdoor ADL       N/A   Grocery shop      x    Medication mgmt    x      Writing x         Typing  x         Dynamic sitting x         Dynamic standing x             Upper Extremity ROM/Strength: Did not formally test. Patient reports this is WFL except that she is sometimes unaware of what her left hand is doing. She realized this because she drops items when she is carrying them.    Sensation: Patient reports normal sensation.    Coordination:                            Right                                           Left                    NT   WNL   Impaired    NT     WNL    Impaired     Gross motor      x         x        Fine motor      x            x     9 hole peg   x         x            LE Orthoses:   No    UE Orthoses:  No    Cognitive Evaluation:  Cognitive function is appropriate for current living environment.    Visual - perceptual Assessment:  Visual-perceptual assessment not indicated.    Pre-Driving Screening:  Not Indicated, patient is not driving at this time.    Treatment Today: Patient instructed on memory games and games to practice multitaksing(denisha, concentration, solitaire). Patient encouraged to continue to use compensatory strategies such as day planner. Recommended she consider a timer near the stove to set every time she cooks as she has left things on the stove recently. Informed patient and her  of an automatic stove shut off. Patient also instructed on diadochokinesis and crossing midline with those exercises.   TREATMENT MINUTES COMMENTS   Evaluation 25    Self-care/ Home management 10    Manual therapy     Neuromuscular  Re-education 8    Therapeutic Exercises     Orthotic fitting           Total 43    Blank areas are intentional and mean the treatment did not include these items.     GOALS AND PLAN OF CARE WERE ESTABLISHED IN COOPERATION WITH THE PATIENT    OT Evaluation Code: (Please list factors)   Comorbidities: HTN  Profile/History Review: Brief    Need for eval modification: No    # Treatment options: Limited    Clinical Decision Making:  Low      Occupational Profile/ Medical and Therapy History and Comorbidities Occupational Performance Clinical Decision Making   (Complexity)   brief history with review of medical/therapy records related to the presenting problem.  No comorbidities 1-3 Performance deficits that result in activity limitations and/or participation restrictions.    No Assessment Modification  Low complexity, which includes  problem-focused assessments, and consideration of a limited number of treatment options.      expanded review of medical/therapy records and additional review of physical, cognitive and psychosocial history.    May have comorbidities 3-5 Performance deficits that result in activity limitations and/or participation restrictions.    Minimal to moderate modification of assessment Moderate complexity, which includes analysis of data from detailed assessments, and consideration of several treatment options.         Review of medical/therapy records and extensive additional review of physical, cognitive and psychosocial history.  Comorbidities affect occupational performance 5 or more Performance deficits that result in activity limitations and/or participation restrictions.    Significant modification of assessment High complexity, analysis of  Occupational profile and data,  Comprehensive assessments, multiple treatment options.          Lizet Sherman  12/13/2018  7:17 AM

## 2021-06-23 NOTE — PROGRESS NOTES
"Palliative Care Progress Note      Consultation - Palliative Care  Janis Yin,  1960, MRN [id number]      PCP: Syed Weiss MD, [phone]   Code status:  DNR/I       Extended Emergency Contact Information  Primary Emergency Contact: Bear Yin  Address: [address] [place] [id number]  Home Phone: [phone]  Mobile Phone: [phone]  Relation: Spouse  Secondary Emergency Contact: Haleigh Cerda  Mobile Phone: [phone]  Relation: Child          Impression and Recommendations:  1.  Palliative Care Encounter   Comment:  Had craniectomy 12/3/18 for removal of tumor; surgery was not able to get all of the cancer; DNR/I confirmed today; would want \"natural death\" if heart stops; would not want to be maintained on ventilator or feeding tube if terminal; plans to meet with hospice tomorrow and sign on;  had opportunity to ask questions today  Plan:  meeting with Carmen Vasquez, psychotherapist tomorrow  Hospice tomorrow  Plans to take trip with extended family to [place] in a couple of weeks  POLST completed today    2.  Cancer related pain  Comment:  Well controlled on oxycodone plus 3 Advil about once per day  Plan:  Continue same; hospice can adjust as needed    3. Anxiety related to terminal diagnosis  Comment:  States she is not anxious on current combination of Celexa and Ativan  Plan:  Continue same regimen  Lots of support from extended family and friends       HPI    Janis Yin is a 58 y.o. year old female presents to the [facility] today for ongoing symptom management and goals of care discussion.  JUAN MANUEL Johns was present as part of the interdisciplinary team.  Patient is accompanied by her  Bear    Last seen in the Palliative Care Clinic on 18.    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  She was treated with chemotherapy and radiation but had more progression " of disease.  Craniotomy was done in June 2018 with gamma knife.  MRI 9/18 showed 5 new brain metastases, started whole brain radiation.  Last had whole brain radiation 10/10/18, and has since moved to the [facility] from [place] to be closer to her children, grandchildren and extended family.  Her  remains in [facility] where he is working.  Janis had hospitalization in November because of falls and mental status changes.  This improved with steroids.  She had re do craniectomy on 12/3/18.  She was told that surgery was not successful in removing all of the cancer.  She is not a candidate for more radiation therapy and has no need for chemotherapy.      Update:  Janis is present today with her  Bear.  We reviewed her Advance Directive and completed a POLST.  She desires DNR.  She has an appointment with hospice tomorrow and will sign on.  Bear is here from [place] where they have their home.  Sheila has spent some time in [place] over the holidays.  Otherwise she has been staying here with her daughter and grandchildren.  She and extended family are planning a trip to the [place] in a couple of weeks.  Her symptoms are well managed today.  She is cheerful and does not have anxiety.  She attributes this to her Celexa and Ativan.  She is having minimal pain.  She has a headache about once a day and takes 1 oxycodone and 3 Advil at that time.    Mr Brain With Without Contrast     Result Date: 12/25/2018  [facility] RADIOLOGY EXAM: MR BRAIN W WO CONTRAST LOCATION: [facility] DATE/TIME: 12/24/2018 2:25 PM INDICATION: Post op diagnostic and radiation planning protocol, brain mets resection 12-3-18, after staple removal 12-24-18 COMPARISON: MRI brain on 12/03/2018. CONTRAST: Gadavist 7.0 TECHNIQUE: Multiplanar multisequence head MRI without and with intravenous contrast including dynamic susceptibility contrast perfusion imaging. FINDINGS: No abnormal restricted diffusion to suggest  acute infarct. Postsurgical changes interval right-sided craniotomy for resection of previously demonstrated enhancing lesions within the right frontal lobe. Persistent irregular nodular contrast enhancement within the midline and right parasagittal frontal frontal lobe along the periphery of the presumed resection cavity extending to the superior sagittal sinus. Suspect that the contrast enhancement represents an evolving postsurgical change, however given the appearance, there may be a component of residual tumor. Recommend attention this region on follow-up imaging. There is persistent confluent signal abnormality within the subcortical and deep white matter of the right frontal lobe which likely represents a combination of vasogenic edema and/or gliosis. The right frontal lobe contents extend into the craniotomy defect.  No definite increased rCBV on perfusion imaging. Foci of gradient susceptibility within the resection cavity which likely represents blood products. No new foci of abnormal contrast enhancement within the brain parenchyma. Multiple foci of gradient hypointensity within the left lentiform nucleus, left cerebellar hemisphere and within the cerebral hemispheres bilaterally which are unchanged. These most likely represent sequela of prior microhemorrhage or microcalcification.      CONCLUSION:  1.  Postsurgical changes interval right-sided craniotomy for resection of previously demonstrated enhancing lesions within the right frontal lobe. There is extension of the right frontal lobe into the craniotomy defect. Irregular nodular contrast enhancement is demonstrated along the margins of the resection cavity in the right parasagittal right frontal lobe extending to the superior sagittal sinus. This most likely represents evolving postsurgical change, however a component of residual tumor could have a similar appearance. Recommend attention this region on follow-up examination. 2.  No new foci of  contrast enhancement. 3.  Stable confluent signal abnormality within the right frontal lobe which most likely represents a combination of vasogenic edema and/or gliosis. 4.  No evidence of acute intracranial hemorrhage or infarct.      Update:  Today's PPS: 60%    Goals of Care  Discussion:   DNR/I.  No heroics including mechanical ventilation or feeding tube.  Plans to sign on to hospice tomorrow.         Review of Systems:  Pain: 3/10  Dyspnea: 0/10  Fatigue: 4/10  Nausea: 4/10  Anorexia: 4/10  Drowsiness: 4/10  Constipation: no  Agitation: 0  Anxiety: 4/10  Depression: 4/10    Dementia: 0   Delirium: 0  Coma: 0    Palliative Performance Score: (0=death; 100 = fully functioning)    60%- 1. Reduced; 2. Unable to do hobby/housework, significant disease; 3. Occasional assistance necessary; 4. Normal or reduced; 5. Full or confusion  Physical Exam:  /60   Pulse 100   Temp 98.2  F (36.8  C) (Oral)   Wt 149 lb 11.2 oz (67.9 kg)   SpO2 99%   BMI 24.16 kg/m    General appearance: alert, appears stated age, cooperative and no distress  Head: wearing head scarf; missing bone flap per report; not visualized  Nose: Nares normal. Septum midline. Mucosa normal. No drainage or sinus tenderness.  Lungs: not labored  Heart: regular rate and rhythm, S1, S2 normal, no murmur, click, rub or gallop  Abdomen: soft, non-tender; bowel sounds normal; no masses,  no organomegaly  Extremities: extremities normal, atraumatic, no cyanosis or edema  Skin: Skin color, texture, turgor normal. No rashes or lesions  Neurologic: Grossly normal       Pertinent Labs  Lab Results: personally reviewed.   Lab Results   Component Value Date     12/09/2018     (L) 12/08/2018     12/08/2018    K 3.9 11/28/2018    K 4.0 11/25/2018    K 4.2 11/24/2018    CO2 24 11/28/2018    CO2 22 11/23/2018    BUN 24 (H) 11/28/2018    BUN 9 11/23/2018    CREATININE 0.59 (L) 12/08/2018    CREATININE 0.64 12/04/2018    CREATININE 0.83 11/28/2018     CALCIUM 10.0 11/28/2018    CALCIUM 9.5 11/23/2018     Lab Results   Component Value Date    WBC 11.6 (H) 11/28/2018    WBC 6.9 11/23/2018    HGB 12.4 11/28/2018    HGB 9.9 (L) 11/24/2018    HGB 10.5 (L) 11/23/2018    HCT 35.9 11/28/2018    HCT 29.8 (L) 11/23/2018    MCV 91 11/28/2018    MCV 90 11/23/2018     11/28/2018     11/25/2018     11/23/2018     AST   Date Value Ref Range Status   11/23/2018 19 0 - 40 U/L Final     ALT   Date Value Ref Range Status   11/23/2018 14 0 - 45 U/L Final     Alkaline Phosphatase   Date Value Ref Range Status   11/23/2018 87 45 - 120 U/L Final     Albumin   Date Value Ref Range Status   11/23/2018 3.7 3.5 - 5.0 g/dL Final      Pertinent Radiology  Radiology Results: Personally reviewed impression/s  EKG Results: not reviewed.      Sheila Martinez, APRN/CNP  [facility]  [phone]    E/M time/ total time: 40 minutes    >50% of time during encounter was spent with family and patient on counseling and/or care coordination as documented above. Y

## 2021-06-23 NOTE — PROGRESS NOTES
Janis is here for her 6 week post op visit. She is s/p  gross total re-resection of two right hemisphere brain mets by Dr. Christine on 12-3-18.    The dura and bone flap were left out as there was tumor in the bone. She states overall she is doing well but the area around incision is still very sensitive and somewhat painful.   Gale,CMA

## 2021-06-23 NOTE — PROGRESS NOTES
"  [facility] Radiation Oncology Follow Up Note    Patient: Janis Yin  MRN: [id number]  Date of Service: 01/14/2019    Assessment / Impression     1. Brain metastases (H)        Cancer Staging  No matching staging information was found for the patient.  ECOG Peformance Status  ECOG Performance Status: 1  Distress Assessment Score  Distress Assessment Score: 3  Interventions  Distress Assessment Intervention: Provider Notified  Body site: Brain    Plan:   1. Keep palliative/hospice appointment. Patient does not with to pursue further imaging which is reasonable.   2. Will provide refill of Ativan, Ativan, and Keppra for patient.   3. \"Hand tremors\" resolved after recent increase in Keppra (1000mg BID), patient likely having partial seizures. If her symptoms return she may need further evaluation by neurology. EEG not indicated at this time.  4. Return to myself PRN.    Face to face time  25 minutes with > 75% spent on consultation, education and coordination of care.    Subjective:      HPI: Janis Yin is a 58 y.o. female with brain mets from endometrial primary.     The patient is from [place] and was initially diagnosed with March 2018 with endometrial cancer and treated with TLH/BSO. In June 2018 she was found to have recurrent endometrial cancer with metastasis to the brain. She underwent craniotomy June 2018 with Gamma Knife, 18 Gy to resection cavity. The patient started Carbo/Taxol chemotherapy.      After 3 cycles of chemotherapy, the patient was found to have multifocal recurrence of brain mets on follow up scans and subsequently received whole brain, 3750 Gy in 15 fractions from 9/20/2018 - 10/10/2018. She stopped chemotherapy at that time and moved to [place] for further cares and family support. Late November 2018 the patient began having falls with left sided foot drop. She was found to have enhancement in the posterior right frontal region, 2.2 x 3.5 x 1.6 cm, as well as a lobulated lesion " lateral to this which measured 1.6 x 1.5 x 1.8 cm.      The patient underwent re-do right craniectomy on 12/3/2018, her pathology returned with metastatic poorly differentiated carcinoma involving at the excisional site, meninges, and dura. No skull involvement.     The patient presents for follow up. Her head discomfort is fairly controlled with oxycodone 5mg, typically taking 1-2 tablets a day.  She denies any further hand tremors since increasing her Keppra. She mentions she is running low on her Ativan, Celexa, and Keppra. The patient otherwise has no complaints and is doing well.    Current Outpatient Medications   Medication Sig Dispense Refill     citalopram (CELEXA) 20 MG tablet Take 20 mg by mouth daily.       famotidine (PEPCID) 20 MG tablet Take 1 tablet (20 mg total) by mouth 2 (two) times a day.  0     hydroCHLOROthiazide (HYDRODIURIL) 25 MG tablet Take 25 mg by mouth daily.       ibuprofen (ADVIL,MOTRIN) 200 MG tablet Take 600 mg by mouth every 6 (six) hours as needed for pain.       levETIRAcetam (KEPPRA) 1000 MG tablet Take 1 tablet (1,000 mg total) by mouth 2 (two) times a day. 60 tablet 1     levothyroxine (SYNTHROID, LEVOTHROID) 50 MCG tablet Take 1 tablet by mouth daily.       LORazepam (ATIVAN) 0.5 MG tablet Take 0.5 mg by mouth every 8 (eight) hours as needed for anxiety .             memantine (NAMENDA) 10 MG tablet Take 10 mg by mouth 2 (two) times a day.       oxyCODONE (ROXICODONE) 5 MG immediate release tablet Take 1 tablet (5 mg total) by mouth 3 (three) times a day as needed. 60 tablet 0     senna (SENOKOT) 8.6 mg tablet Take 1 tablet by mouth 2 (two) times a day as needed.       acetaminophen (TYLENOL) 325 MG tablet Take 2 tablets (650 mg total) by mouth every 4 (four) hours as needed. (Patient taking differently: Take 500 mg by mouth every 6 (six) hours as needed.       )  0     b complex vitamins tablet Take 1 tablet by mouth daily.       glucosamine sulfate 500 mg Tab Take 2 capsules  by mouth daily.       multivitamin (ONE A DAY) per tablet Take 3 tablets by mouth daily.       ondansetron (ZOFRAN) 8 MG tablet Take 8 mg by mouth as needed.       No current facility-administered medications for this visit.        The following portions of the patient's history were reviewed and updated as appropriate: allergies, current medications, past family history, past medical history, past social history, past surgical history and problem list.    Review of Systems    General  Constitutional (WDL): Exceptions to WDL  Fatigue: Fatigue relieved by rest  EENT  Eye Disorder (WDL): All eye disorder elements are within defined limits(wears glasses)  Ear Disorder (WDL): All ear disorder elements are within defined limits  Respiratory       Respiratory (WDL): Within Defined Limits  Cardiovascular  Cardiovascular (WDL): All cardiovascular elements are within defined limits  Endocrine     Gastrointestinal  Gastrointestinal (WDL): Exceptions to WDL  Anorexia: Oral intake altered without significant weight loss or malnutrition, oral nutritional supplements indicated  Musculoskeletal  Musculoskeletal and Connetive Tissue Disorders (WDL): Exceptions to WDL  Muscle Weakness : Symptomatic, evident on physical exam, limiting instrumental ADL(L>R sided weakness)  Integumentary               Integumentary (WDL): Exceptions to WDL  Alopecia: Hair loss of >50% normal for that individual that is readily apparent to others, a wig or hair piece is necessary if the patient desires to completely camouflage the hair loss, associated with psychosocial impact  Neurological  Neurosensory (WDL): Exceptions to WDL  Dizziness: Mild unsteadiness or sensation of movement(when changing positions)  Psychological/Emotional   Patient Coping: Accepting  Hematological/Lymphatic  Lymph (WDL): All lymph disorder elements are within defined limits  Dermatologic     Genitourinary/Reproductive  Genitourinary (WDL): All genitourinary elements are within  defined limits  Reproductive     Pain              Currently in Pain: Yes  Pain Score (Initial OR Reassessment): 4  Pain Frequency: Constant/continuous  Location: incision, scalp  Pain Intervention(s): Home medication  Response to Interventions: good  AUA Assessment                                  Accompanied by  Accompanied by: Family Member      Objective:     Physical Exam    Vitals:    01/14/19 1438   BP: 113/53   Pulse: 84   Temp: 97.8  F (36.6  C)   TempSrc: Oral   SpO2: 100%   Weight: 146 lb 12.8 oz (66.6 kg)        GENERAL: No acute distress. Cooperative in conversation.   HEENT: pupils are equal, round and reactive. [place]romucosa moist.  RESP: Normal respiratory effort.   ABD: Soft, nontender.  MUSCULOSKELETAL: no palpable points of tenderness   NEURO: Non focal. Alert and oriented x3.   PSYCH: Within normal limits. No depression or anxiety.  SKIN: Warm dry intact.   EXTREMITIES: No edema.        Recent Labs: No results found for this or any previous visit (from the past 168 hour(s)).    Imaging: Imaging results 30 days: Mr Brain With Without Contrast    Result Date: 12/25/2018  [facility] RADIOLOGY EXAM: MR BRAIN W WO CONTRAST LOCATION: [facility] DATE/TIME: 12/24/2018 2:25 PM INDICATION: Post op diagnostic and radiation planning protocol, brain mets resection 12-3-18, after staple removal 12-24-18 COMPARISON: MRI brain on 12/03/2018. CONTRAST: Gadavist 7.0 TECHNIQUE: Multiplanar multisequence head MRI without and with intravenous contrast including dynamic susceptibility contrast perfusion imaging. FINDINGS: No abnormal restricted diffusion to suggest acute infarct. Postsurgical changes interval right-sided craniotomy for resection of previously demonstrated enhancing lesions within the right frontal lobe. Persistent irregular nodular contrast enhancement within the midline and right parasagittal frontal frontal lobe along the periphery of the presumed resection cavity extending to the superior  sagittal sinus. Suspect that the contrast enhancement represents an evolving postsurgical change, however given the appearance, there may be a component of residual tumor. Recommend attention this region on follow-up imaging. There is persistent confluent signal abnormality within the subcortical and deep white matter of the right frontal lobe which likely represents a combination of vasogenic edema and/or gliosis. The right frontal lobe contents extend into the craniotomy defect.  No definite increased rCBV on perfusion imaging. Foci of gradient susceptibility within the resection cavity which likely represents blood products. No new foci of abnormal contrast enhancement within the brain parenchyma. Multiple foci of gradient hypointensity within the left lentiform nucleus, left cerebellar hemisphere and within the cerebral hemispheres bilaterally which are unchanged. These most likely represent sequela of prior microhemorrhage or microcalcification.     CONCLUSION:  1.  Postsurgical changes interval right-sided craniotomy for resection of previously demonstrated enhancing lesions within the right frontal lobe. There is extension of the right frontal lobe into the craniotomy defect. Irregular nodular contrast enhancement is demonstrated along the margins of the resection cavity in the right parasagittal right frontal lobe extending to the superior sagittal sinus. This most likely represents evolving postsurgical change, however a component of residual tumor could have a similar appearance. Recommend attention this region on follow-up examination. 2.  No new foci of contrast enhancement. 3.  Stable confluent signal abnormality within the right frontal lobe which most likely represents a combination of vasogenic edema and/or gliosis. 4.  No evidence of acute intracranial hemorrhage or infarct.     Janis EWING MD personally performed the services described in this documentation, as scribed by Elvin  Ca in my presence, and it is both accurate and complete.    Signed by: Janis Jarrett MD, MPH

## 2021-06-23 NOTE — PROGRESS NOTES
Patient here ambulatory accompanied by  for follow up s/p surgery for her brain mets.  Patient states she is recovering from her surgery well but still has some fatigue.  Continued weakness L>R but feels this has improved.  Decreased appetite.  Patient states she has a lot of appointments this week with palliative care, Carmen Vasquez in psychotherapy, neurosurgery and an information meeting with home hospice.  Continued tenderness at the incision.  Seen by Dr. Jarrett.  CT scan cancelled for later this week.  Per Dr. Jarrett patient will follow up in radiation as needed.

## 2021-06-23 NOTE — PROGRESS NOTES
Psychology Psychotherapy  Note    Name:  Janis Yin  :  1960  MRN:  [id number]      Date of Service: 2019  Duration: 60 minutes (10:00-11:00)    Target Symptoms:    The patient was seen in light of concerns regarding symptoms of sadness and worry as evidenced by patient and staff report.    Participation:  The patient was able to participate and benefit from treatment as evidenced by her verbal expression of ideas and initiation of topics discussed.    Mental Status:    Mood:  sad  Affect:  mood congruent  Suicidal Ideation:  absent  Homicidal Ideation:  absent  Thought process:  normal  Thought content:  Normal  Fund of Knowledge:  Sufficient  Attention/Concentration:  limited  Language ability:  intact  Speech: normal  Memory: Memory and processing difficulties due to brain metastases  Insight and Judgement:  limited  Orientation:  person, place, time and situation  Appearance: casually-dressed,  and engaged,  Eye Contact:  Appropriate  Estimated IQ:  Average      Intervention:    Sheila in her , Bear were interested in meeting for an emotional support visit to discuss how she is coping with her metastatic endometrial cancer with brain metastases.  She was originally diagnosed in 2018.  They shared the story of her treatment both in [facility], where they live and also some recent care over these past 3 months at [facility].  They were informed at a recent clinic visit that there are no more treatment options at this point.  They have an understanding that Sheila has about a 6-month life expectancy.  They met with our palliative care team on Tuesday, 1/15/2019.  They also had a hospice intake on Wednesday, 2019.  They were interested in meeting today to processed her thoughts and feelings about this stage of her life and also end of life issues.    Sheila and Bear have been  for 12 years.  They live in [facility]. Sheila was born and raised in [place]  "[place].  She is the oldest family of 7 children.  All of her 6 siblings live in [place].  She also has 2 children from a previous relationship.  Her oldest daughter, Astrid, age 34 is  with 4 children.  Her youngest son, Jose Carlos, age 30 is  and has 2 children.  For the past 3 months, Sheila has been staying with Astrid and her family.  It has been quite hectic as Astrid has 4 young children.  In addition, Sheila describes her as a \"helicopter daughter\" who is very involved and Sheila feels that sometimes she just needs a break.  For future visits to [place], they plan to split up her time between her sister Jeanne's home in [place] and her daughter's home.    Sheila and Bear have 1 more appointment tomorrow with surgeon.  They then plan to to [facility].  They will be home for 1 week.  They then plan to go on a 10-day family vacation to [place].  There will be about 14 or 15 people on this trip including Bear,  all of her siblings and her 2 children.  She is looking forward to this trip as [place] is her favorite place to vacation.    Sheila is feeling fairly well and denies having any symptoms of pain.  She walks independently.  She displays some difficulty in processing information and also with her memory.  They are aware that she needs to have supervision most of the time.     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  She currently is on Social Security disability.      We discussed how Sheila is coping emotionally with her illness.  She has recently started on Celexa and Ativan.  She feels that she is coping fairly well.  She seems to have a positive outlook and uses humor to help her cope.  She denies suicidal ideation.  Her main concern is worrying about her grandchildren after she dies and how they will cope.  We discussed this at length.  We also talked about her beliefs about after life.  She identifies as a Roman Catholic Sikh.  We also discussed strategies " about planning for end of life including decisions about being in [place] versus in [place].  We discussed strategies to help with communication with her grandchildren.  I also gave her various resources to help in this area. Sheila does not meet diagnostic criteria for mood disorder or an anxiety disorder.  She is experiencing some normal and natural emotions related to coping with her cancer and end-of-life issues.    Psychoterapeutic Techniques:  Cognitive-behavioral therapy, motivational interviewing and supportive psychotherapy strategies were utilized.    Necessity:    The session was necessary for the care of the patient to address symptoms of sadness and worry related to the patient's medical condition.    Progress:    Twin shared the story of her cancer journey.  She also talked about being at the point where they are no longer going to do any treatment and are preparing for end of life.  We discussed end-of-life issues at length.  She met with hospice yesterday.  They decided to wait to sign onto hospice until after they return from the trip.  They may start utilizing hospice in [place] and then possibly transferring back to [facility] hospice as she gets closer to end-of-life.  We discussed strategies to help her manage muscle aspects of her cancer and end-of-life issues.    Plan:    Twin will contact me if further needs arise particularly related to planning and discussing end-of-life issues.  They also may have lives and her daughter come together for a meeting to discuss some relationship issues.  At this point, they will be out of state for most likely at least a month or more, but will contact me as needed.    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 Endometrial cancer (H)    3. Brain metastasis (H)        Problem List:  Patient Active Problem List   Diagnosis     Chronic eczematous otitis externa of both ears     Endometrial cancer  (H)     HTN (hypertension)     Hypothyroidism     Brain metastases (H)     Anxiety     Depression     Vasogenic edema (H)     Midline shift of brain     Brain tumor (H)       Provider: Carmen Vasquez MA, LP, LICSW    Date:  1/17/2019  Time:  2:25 PM

## 2021-06-23 NOTE — PROGRESS NOTES
Patient is about 6 weeks postop removal of brain metastases.  Bone and dura were removed also.  She has seen Dr. Jarrett for radiation and Dr. Emanuel for chemotherapy.  She is going to get a follow-up MRI 3 months postop, sooner as needed.

## 2021-06-23 NOTE — PROGRESS NOTES
Pt ambulatory to radiation clinic with  for palliative care follow up. Further recommendations and orders per palliative care team.

## 2021-06-24 NOTE — TELEPHONE ENCOUNTER
Bear is calling to get the results of the pathology from December, please call Bear @ [phone].

## 2021-06-24 NOTE — PROGRESS NOTES
Sheila's , Bear requested to meet with me for an urgent session today.  We were able to meet between 12:00 and 1:00 today.  Sheila currently is in hospice and transferred yesterday to our [facility] hospice.  She has been unresponsive for the past 3 days.  Bear was interested in meeting to process some of his feelings of grief and loss related to her impending death.  We discussed strategies for him to release his grief.  We also discussed strategies to help during this time as they prepare for her death.    Plan: I will continue to be available to Bear and the family for ongoing support and assistance as needed.    Carmen Vasquez MA, LP, LICSW

## 2021-06-26 ENCOUNTER — HEALTH MAINTENANCE LETTER (OUTPATIENT)
Age: 61
End: 2021-06-26

## 2021-07-03 NOTE — ADDENDUM NOTE
Addendum Note by Chad Emanuel MD at 12/18/2018 10:45 AM     Author: Chad Emanuel MD Service: -- Author Type: Physician    Filed: 12/30/2018  4:17 PM Encounter Date: 12/18/2018 Status: Signed    : Chad Emanuel MD (Physician)    Addended by: CHAD EMANUEL on: 12/30/2018 04:17 PM        Modules accepted: Orders

## 2021-07-03 NOTE — ADDENDUM NOTE
Addendum Note by Luis Christine MD at 1/18/2019  9:30 AM     Author: Luis Christine MD Service: -- Author Type: Physician    Filed: 1/18/2019 10:18 AM Encounter Date: 1/18/2019 Status: Signed    : Luis Christine MD (Physician)    Addended by: LUIS CHRISTINE on: 1/18/2019 10:18 AM        Modules accepted: Orders

## 2021-10-16 ENCOUNTER — HEALTH MAINTENANCE LETTER (OUTPATIENT)
Age: 61
End: 2021-10-16

## 2022-03-23 NOTE — TELEPHONE ENCOUNTER
I left detailed message and instructed pt and  OK to fly, just to be careful about bumping head when getting in the seat and with people putting/pulling luggage out of overhead bins.  Daisha Marcano RN     Performing Laboratory: 0

## 2022-07-23 ENCOUNTER — HEALTH MAINTENANCE LETTER (OUTPATIENT)
Age: 62
End: 2022-07-23

## 2022-10-01 ENCOUNTER — HEALTH MAINTENANCE LETTER (OUTPATIENT)
Age: 62
End: 2022-10-01

## 2023-04-03 PROBLEM — C79.31 MALIGNANT NEOPLASM METASTATIC TO BRAIN (H): Status: ACTIVE | Noted: 2018-11-23

## 2023-08-06 ENCOUNTER — HEALTH MAINTENANCE LETTER (OUTPATIENT)
Age: 63
End: 2023-08-06